# Patient Record
Sex: FEMALE | Race: WHITE | NOT HISPANIC OR LATINO | Employment: OTHER | URBAN - METROPOLITAN AREA
[De-identification: names, ages, dates, MRNs, and addresses within clinical notes are randomized per-mention and may not be internally consistent; named-entity substitution may affect disease eponyms.]

---

## 2023-03-14 LAB
APPEARANCE, URINE: CLEAR
BILIRUBIN, URINE: NEGATIVE
BLOOD, URINE: ABNORMAL
CALCIUM OXALATE CRYSTALS, URINE: ABNORMAL /HPF
COLOR, URINE: YELLOW
GLUCOSE, URINE: NEGATIVE MG/DL
KETONES, URINE: ABNORMAL MG/DL
LEUKOCYTE ESTERASE, URINE: ABNORMAL
MUCUS, URINE: ABNORMAL /LPF
NITRITE, URINE: NEGATIVE
PH, URINE: 7 (ref 5–8)
PROTEIN, URINE: NEGATIVE MG/DL
RBC, URINE: 2 /HPF (ref 0–5)
SPECIFIC GRAVITY, URINE: 1.01 (ref 1–1.03)
SQUAMOUS EPITHELIAL CELLS, URINE: 1 /HPF
UROBILINOGEN, URINE: <2 MG/DL (ref 0–1.9)
WBC, URINE: 6 /HPF (ref 0–5)

## 2023-03-15 LAB — URINE CULTURE: ABNORMAL

## 2023-03-20 LAB
ALANINE AMINOTRANSFERASE (SGPT) (U/L) IN SER/PLAS: <3 U/L (ref 7–45)
ALBUMIN (G/DL) IN SER/PLAS: 3.4 G/DL (ref 3.4–5)
ALKALINE PHOSPHATASE (U/L) IN SER/PLAS: 76 U/L (ref 33–136)
ANION GAP IN SER/PLAS: 10 MMOL/L (ref 10–20)
ASPARTATE AMINOTRANSFERASE (SGOT) (U/L) IN SER/PLAS: 11 U/L (ref 9–39)
BASOPHILS (10*3/UL) IN BLOOD BY AUTOMATED COUNT: 0.02 X10E9/L (ref 0–0.1)
BASOPHILS/100 LEUKOCYTES IN BLOOD BY AUTOMATED COUNT: 0.2 % (ref 0–2)
BILIRUBIN TOTAL (MG/DL) IN SER/PLAS: 0.3 MG/DL (ref 0–1.2)
CALCIUM (MG/DL) IN SER/PLAS: 8.9 MG/DL (ref 8.6–10.3)
CARBON DIOXIDE, TOTAL (MMOL/L) IN SER/PLAS: 29 MMOL/L (ref 21–32)
CHLORIDE (MMOL/L) IN SER/PLAS: 94 MMOL/L (ref 98–107)
CHOLESTEROL (MG/DL) IN SER/PLAS: 206 MG/DL (ref 0–199)
CHOLESTEROL IN HDL (MG/DL) IN SER/PLAS: 71.2 MG/DL
CHOLESTEROL/HDL RATIO: 2.9
CREATININE (MG/DL) IN SER/PLAS: 0.74 MG/DL (ref 0.5–1.05)
EOSINOPHILS (10*3/UL) IN BLOOD BY AUTOMATED COUNT: 0.08 X10E9/L (ref 0–0.4)
EOSINOPHILS/100 LEUKOCYTES IN BLOOD BY AUTOMATED COUNT: 1 % (ref 0–6)
ERYTHROCYTE DISTRIBUTION WIDTH (RATIO) BY AUTOMATED COUNT: 13.5 % (ref 11.5–14.5)
ERYTHROCYTE MEAN CORPUSCULAR HEMOGLOBIN CONCENTRATION (G/DL) BY AUTOMATED: 32.3 G/DL (ref 32–36)
ERYTHROCYTE MEAN CORPUSCULAR VOLUME (FL) BY AUTOMATED COUNT: 93 FL (ref 80–100)
ERYTHROCYTES (10*6/UL) IN BLOOD BY AUTOMATED COUNT: 3.77 X10E12/L (ref 4–5.2)
GFR FEMALE: 79 ML/MIN/1.73M2
GLUCOSE (MG/DL) IN SER/PLAS: 153 MG/DL (ref 74–99)
HEMATOCRIT (%) IN BLOOD BY AUTOMATED COUNT: 35 % (ref 36–46)
HEMOGLOBIN (G/DL) IN BLOOD: 11.3 G/DL (ref 12–16)
IMMATURE GRANULOCYTES/100 LEUKOCYTES IN BLOOD BY AUTOMATED COUNT: 0.4 % (ref 0–0.9)
LDL: 105 MG/DL (ref 0–99)
LEUKOCYTES (10*3/UL) IN BLOOD BY AUTOMATED COUNT: 8.2 X10E9/L (ref 4.4–11.3)
LYMPHOCYTES (10*3/UL) IN BLOOD BY AUTOMATED COUNT: 1.04 X10E9/L (ref 0.8–3)
LYMPHOCYTES/100 LEUKOCYTES IN BLOOD BY AUTOMATED COUNT: 12.7 % (ref 13–44)
MAGNESIUM (MG/DL) IN SER/PLAS: 1.73 MG/DL (ref 1.6–2.4)
MONOCYTES (10*3/UL) IN BLOOD BY AUTOMATED COUNT: 0.64 X10E9/L (ref 0.05–0.8)
MONOCYTES/100 LEUKOCYTES IN BLOOD BY AUTOMATED COUNT: 7.8 % (ref 2–10)
NEUTROPHILS (10*3/UL) IN BLOOD BY AUTOMATED COUNT: 6.41 X10E9/L (ref 1.6–5.5)
NEUTROPHILS/100 LEUKOCYTES IN BLOOD BY AUTOMATED COUNT: 77.9 % (ref 40–80)
PLATELETS (10*3/UL) IN BLOOD AUTOMATED COUNT: 375 X10E9/L (ref 150–450)
POTASSIUM (MMOL/L) IN SER/PLAS: 4.3 MMOL/L (ref 3.5–5.3)
PROTEIN TOTAL: 5.9 G/DL (ref 6.4–8.2)
SODIUM (MMOL/L) IN SER/PLAS: 129 MMOL/L (ref 136–145)
THYROTROPIN (MIU/L) IN SER/PLAS BY DETECTION LIMIT <= 0.05 MIU/L: 1.05 MIU/L (ref 0.44–3.98)
TRIGLYCERIDE (MG/DL) IN SER/PLAS: 151 MG/DL (ref 0–149)
UREA NITROGEN (MG/DL) IN SER/PLAS: 19 MG/DL (ref 6–23)
VLDL: 30 MG/DL (ref 0–40)

## 2023-03-21 LAB
CALCIDIOL (25 OH VITAMIN D3) (NG/ML) IN SER/PLAS: 38 NG/ML
COBALAMIN (VITAMIN B12) (PG/ML) IN SER/PLAS: 433 PG/ML (ref 211–911)
ESTIMATED AVERAGE GLUCOSE FOR HBA1C: 117 MG/DL
HEMOGLOBIN A1C/HEMOGLOBIN TOTAL IN BLOOD: 5.7 %

## 2023-10-27 DIAGNOSIS — G20.A1 PARKINSON'S DISEASE, UNSPECIFIED WHETHER DYSKINESIA PRESENT, UNSPECIFIED WHETHER MANIFESTATIONS FLUCTUATE (MULTI): ICD-10-CM

## 2023-10-27 RX ORDER — CARBIDOPA AND LEVODOPA 25; 100 MG/1; MG/1
TABLET ORAL
COMMUNITY
Start: 2018-03-23 | End: 2023-10-27 | Stop reason: SDUPTHER

## 2023-10-27 RX ORDER — CARBIDOPA AND LEVODOPA 25; 100 MG/1; MG/1
TABLET ORAL
Qty: 450 TABLET | Refills: 1 | Status: SHIPPED | OUTPATIENT
Start: 2023-10-27 | End: 2024-04-26 | Stop reason: SDUPTHER

## 2023-11-20 PROBLEM — M62.838 MUSCLE SPASM: Status: ACTIVE | Noted: 2023-11-20

## 2023-11-20 PROBLEM — M81.0 OSTEOPOROSIS: Status: ACTIVE | Noted: 2023-11-20

## 2023-11-20 PROBLEM — F41.9 ANXIETY: Status: ACTIVE | Noted: 2023-11-20

## 2023-11-20 PROBLEM — E11.9 TYPE 2 DIABETES MELLITUS (MULTI): Status: ACTIVE | Noted: 2023-11-20

## 2023-11-20 PROBLEM — F32.9 MAJOR DEPRESSIVE DISORDER, SINGLE EPISODE, UNSPECIFIED: Status: ACTIVE | Noted: 2023-11-20

## 2023-11-20 PROBLEM — F41.1 GENERALIZED ANXIETY DISORDER: Status: ACTIVE | Noted: 2023-11-20

## 2023-11-20 PROBLEM — R32 INCONTINENCE: Status: ACTIVE | Noted: 2023-11-20

## 2023-11-20 PROBLEM — G47.00 INSOMNIA: Status: ACTIVE | Noted: 2023-11-20

## 2023-11-20 PROBLEM — I77.9 CAROTID ARTERY DISEASE (CMS-HCC): Status: ACTIVE | Noted: 2023-11-20

## 2023-11-20 PROBLEM — R13.10 DYSPHAGIA: Status: ACTIVE | Noted: 2023-11-20

## 2023-11-20 PROBLEM — K21.9 GASTROESOPHAGEAL REFLUX DISEASE: Status: ACTIVE | Noted: 2023-11-20

## 2023-11-20 PROBLEM — M19.90 OSTEOARTHRITIS: Status: ACTIVE | Noted: 2023-11-20

## 2023-11-20 PROBLEM — N39.41 URGE INCONTINENCE OF URINE: Status: ACTIVE | Noted: 2023-11-20

## 2023-11-20 PROBLEM — I25.10 CORONARY ARTERIOSCLEROSIS: Status: ACTIVE | Noted: 2023-11-20

## 2023-11-20 PROBLEM — B02.29 POSTHERPETIC NEURALGIA: Status: ACTIVE | Noted: 2023-11-20

## 2023-11-20 PROBLEM — E78.5 HYPERLIPIDEMIA: Status: ACTIVE | Noted: 2023-11-20

## 2023-11-20 PROBLEM — M41.9 SCOLIOSIS OF LUMBAR SPINE: Status: ACTIVE | Noted: 2023-11-20

## 2023-11-20 RX ORDER — NYSTATIN 100000 U/G
1 OINTMENT TOPICAL 2 TIMES DAILY
COMMUNITY

## 2023-11-20 RX ORDER — TIZANIDINE 2 MG/1
2 TABLET ORAL 3 TIMES DAILY PRN
COMMUNITY
Start: 2022-09-30 | End: 2024-05-13 | Stop reason: SDUPTHER

## 2023-11-20 RX ORDER — POLYETHYLENE GLYCOL 3350 17 G/17G
POWDER, FOR SOLUTION ORAL
COMMUNITY
Start: 2023-01-11

## 2023-11-20 RX ORDER — DOCUSATE SODIUM 100 MG/1
200 CAPSULE, LIQUID FILLED ORAL 2 TIMES DAILY PRN
COMMUNITY
Start: 2021-04-21

## 2023-11-20 RX ORDER — HYDROXYZINE HYDROCHLORIDE 25 MG/1
25 TABLET, FILM COATED ORAL DAILY PRN
COMMUNITY
Start: 2022-11-02

## 2023-11-20 RX ORDER — AMOXICILLIN 250 MG
2 CAPSULE ORAL 2 TIMES DAILY
COMMUNITY

## 2023-11-20 RX ORDER — SERTRALINE HYDROCHLORIDE 100 MG/1
1.5 TABLET, FILM COATED ORAL NIGHTLY
COMMUNITY
Start: 2023-08-17 | End: 2024-06-07 | Stop reason: SDUPTHER

## 2023-11-20 RX ORDER — GABAPENTIN 100 MG/1
100 CAPSULE ORAL 3 TIMES DAILY
COMMUNITY

## 2023-11-22 ENCOUNTER — APPOINTMENT (OUTPATIENT)
Dept: PRIMARY CARE | Facility: CLINIC | Age: 88
End: 2023-11-22
Payer: COMMERCIAL

## 2023-11-24 ENCOUNTER — TELEPHONE (OUTPATIENT)
Dept: PRIMARY CARE | Facility: CLINIC | Age: 88
End: 2023-11-24
Payer: COMMERCIAL

## 2023-11-27 ENCOUNTER — OFFICE VISIT (OUTPATIENT)
Dept: PRIMARY CARE | Facility: CLINIC | Age: 88
End: 2023-11-27
Payer: COMMERCIAL

## 2023-11-27 VITALS
DIASTOLIC BLOOD PRESSURE: 68 MMHG | TEMPERATURE: 97.1 F | HEART RATE: 62 BPM | SYSTOLIC BLOOD PRESSURE: 132 MMHG | OXYGEN SATURATION: 96 %

## 2023-11-27 DIAGNOSIS — F32.9 MAJOR DEPRESSIVE DISORDER WITH SINGLE EPISODE, REMISSION STATUS UNSPECIFIED: ICD-10-CM

## 2023-11-27 DIAGNOSIS — E11.9 TYPE 2 DIABETES MELLITUS WITHOUT COMPLICATION, WITHOUT LONG-TERM CURRENT USE OF INSULIN (MULTI): ICD-10-CM

## 2023-11-27 DIAGNOSIS — Z00.00 HEALTHCARE MAINTENANCE: ICD-10-CM

## 2023-11-27 DIAGNOSIS — G20.A1 PARKINSON'S DISEASE, UNSPECIFIED WHETHER DYSKINESIA PRESENT, UNSPECIFIED WHETHER MANIFESTATIONS FLUCTUATE (MULTI): Primary | ICD-10-CM

## 2023-11-27 DIAGNOSIS — I77.9 CAROTID ARTERY DISEASE, UNSPECIFIED LATERALITY, UNSPECIFIED TYPE (CMS-HCC): ICD-10-CM

## 2023-11-27 DIAGNOSIS — F41.1 GENERALIZED ANXIETY DISORDER: ICD-10-CM

## 2023-11-27 DIAGNOSIS — E55.9 VITAMIN D DEFICIENCY: ICD-10-CM

## 2023-11-27 PROCEDURE — 1159F MED LIST DOCD IN RCRD: CPT | Performed by: NURSE PRACTITIONER

## 2023-11-27 PROCEDURE — 99349 HOME/RES VST EST MOD MDM 40: CPT | Performed by: NURSE PRACTITIONER

## 2023-11-27 PROCEDURE — 1126F AMNT PAIN NOTED NONE PRSNT: CPT | Performed by: NURSE PRACTITIONER

## 2023-11-27 PROCEDURE — 1160F RVW MEDS BY RX/DR IN RCRD: CPT | Performed by: NURSE PRACTITIONER

## 2023-11-27 ASSESSMENT — ENCOUNTER SYMPTOMS
FEVER: 0
DIZZINESS: 0
ABDOMINAL PAIN: 0
DIFFICULTY URINATING: 0
CHILLS: 0
ABDOMINAL DISTENTION: 0
SHORTNESS OF BREATH: 0
JOINT SWELLING: 0
WEAKNESS: 1
NAUSEA: 0
ADENOPATHY: 0
PALPITATIONS: 0
EYE PAIN: 0
BRUISES/BLEEDS EASILY: 0
CONSTIPATION: 0
DIARRHEA: 0
COLOR CHANGE: 0
WOUND: 0
TROUBLE SWALLOWING: 0
HEADACHES: 0
MYALGIAS: 0
SEIZURES: 0
FATIGUE: 1
WHEEZING: 0
COUGH: 0

## 2023-11-27 ASSESSMENT — PAIN SCALES - GENERAL: PAINLEVEL: 0-NO PAIN

## 2023-11-27 NOTE — PROGRESS NOTES
Subjective   Patient ID: Roihni Butler is a 88 y.o. female who presents for F2F [facility] PT/OT/ST.  Spouse requesting [facility]    HPI   Patient seen today up in recliner with , Jhonatan, present for assessment. Patient is a very poor historian secondary to Parkinson's/ dementia; HPI and ROS were supplemented by patient's spouse and patient's chart. Patient is alert and calm today; spouse is now voicing concerns that he feels patient may be depressed. Patient remains uable to walk but she continues to do stregthen exercises with spouse and caregivers. No recent falls reportedOne fall occurred several weeks ago without associated injury. She continues to have private duty care givers 5-6 days a week for 5 hours at a time. The couple's 5 sons continue take turn throughout the week coming over to give addtional support to patient. Overall appetite and hydration status stable per spouse. No recently reported issues with dysphagia, bowel or bladder. Spouse continues to manage all of patient's medications and medical care    Bilateral Knee Pain/ OA/ Muscle Spasms - Muscle relaxant given daily with some success. Patient and spouse states that stretching patient's legs and easing her contrations of BLE. Family is once again interested in receiving [facility] therapy services.    Anxiety/ Depression - Patient continues to follow with [facility]. No recent medication changes or reported issues with insomnia. Follow-up appointment in place for next week    DM2 - Patient's blood glucose levels have not been monitored. Spouse states appetite is fair and no reported episodes of hypo or hyper glycemia.    Home Visit: Medically necessary due to: patient has chronic condition that makes access to a traditional office visit very difficult, Illness or condition that results in activity lmitation or restriction that impacts the ability to leave home such as:, unsteady gait/poor balance, dementia/cognitive impairment.        Current Outpatient Medications:     carbidopa-levodopa (Sinemet)  mg tablet, 2 tabs in am; 2 tabs in afternoon; 1 tab at pm Orally Three times a day, Disp: 450 tablet, Rfl: 1    docusate sodium (Colace) 100 mg capsule, Take 2 capsules (200 mg) by mouth 2 times a day as needed for constipation., Disp: , Rfl:     gabapentin (Neurontin) 100 mg capsule, Take 1 capsule (100 mg) by mouth 3 times a day., Disp: , Rfl:     hydrOXYzine HCL (Atarax) 25 mg tablet, Take 1 tablet (25 mg) by mouth once daily as needed., Disp: , Rfl:     nystatin (Mycostatin) ointment, Apply 1 Application topically 2 times a day., Disp: , Rfl:     polyethylene glycol (Miralax) 17 gram/dose powder, as directed Orally Once a day, Disp: , Rfl:     sennosides-docusate sodium (Perlita-Colace) 8.6-50 mg tablet, Take 2 tablets by mouth 2 times a day., Disp: , Rfl:     sertraline (Zoloft) 100 mg tablet, Take 1 tablet (100 mg) by mouth once daily at bedtime., Disp: , Rfl:     tiZANidine (Zanaflex) 2 mg tablet, Take 1 tablet (2 mg) by mouth 3 times a day as needed for muscle spasms., Disp: , Rfl:      Review of Systems   Constitutional:  Positive for fatigue. Negative for chills and fever.        Positive for generalized malaise and fatigue (chronic)   HENT:  Negative for dental problem and trouble swallowing.    Eyes:  Negative for pain and visual disturbance.   Respiratory:  Negative for cough, shortness of breath and wheezing.    Cardiovascular:  Negative for chest pain, palpitations and leg swelling.   Gastrointestinal:  Negative for abdominal distention, abdominal pain, constipation, diarrhea and nausea.   Endocrine: Negative for cold intolerance and heat intolerance.   Genitourinary:  Negative for difficulty urinating.   Musculoskeletal:  Negative for gait problem, joint swelling and myalgias.   Skin:  Negative for color change, pallor, rash and wound.   Allergic/Immunologic: Negative for environmental allergies and food allergies.    Neurological:  Positive for weakness. Negative for dizziness, seizures and headaches.   Hematological:  Negative for adenopathy. Does not bruise/bleed easily.   Psychiatric/Behavioral:  Negative for behavioral problems.         Positive for dementia   All other systems reviewed and are negative.      Objective   /68   Pulse 62   Temp 36.2 °C (97.1 °F)   SpO2 96%     Physical Exam  Constitutional:       General: She is not in acute distress.     Appearance: Normal appearance. She is not toxic-appearing.   HENT:      Head: Normocephalic and atraumatic.      Nose: Nose normal.      Mouth/Throat:      Mouth: Mucous membranes are moist.      Pharynx: Oropharynx is clear.   Eyes:      Extraocular Movements: Extraocular movements intact.      Conjunctiva/sclera: Conjunctivae normal.      Pupils: Pupils are equal, round, and reactive to light.   Cardiovascular:      Rate and Rhythm: Normal rate and regular rhythm.      Pulses: Normal pulses.      Heart sounds: Murmur heard.   Pulmonary:      Effort: Pulmonary effort is normal.      Breath sounds: Normal breath sounds. No wheezing.   Abdominal:      General: Abdomen is flat. Bowel sounds are normal.      Palpations: Abdomen is soft.   Musculoskeletal:         General: Deformity present. No swelling.      Cervical back: Neck supple.      Comments: BLE contractures   Skin:     General: Skin is warm and dry.      Comments: Perlita-area not observed   Neurological:      Mental Status: She is alert. Mental status is at baseline.      Cranial Nerves: No cranial nerve deficit.      Motor: Weakness present.      Comments: Oriented to self. Non-ambulatory   Psychiatric:      Comments: Positive for dementia and memory loss         Assessment/Plan   Problem List Items Addressed This Visit             ICD-10-CM       Cardiac and Vasculature     Patient currently off of any cardiac medications. Will continue to monitor VS as needed. Spouse to notify provider for any new cardiac  concerns         Carotid artery disease (CMS/[facility]) I77.9       Endocrine/Metabolic     Patient off of any diabetic medications. Will monitor A1C routinely. Spouse to notify provider for any symptoms associated with hypo or hyper glycemia         Type 2 diabetes mellitus (CMS/[facility]) E11.9    Relevant Orders    Hemoglobin A1C    Comprehensive Metabolic Panel    CBC       Mental Health     Patient to continue medication regiment. She is to maintain routine follow up with [facility] for continued evaluation and treatments         Major depressive disorder, single episode, unspecified F32.9    Generalized anxiety disorder F41.1       Neuro     Patient will require [facility] PT/OT/ST services. PT/OT required in order to improve strength, balance and endurance all while decreasing fall risk and pain. ST required to assess for dysphagia given progressive disease         Parkinson's disease - Primary G20.A1     Other Visit Diagnoses         Codes    Healthcare maintenance     Z00.00    Relevant Orders    TSH with reflex to Free T4 if abnormal    Vitamin D deficiency     E55.9    Relevant Orders    Vitamin D 25-Hydroxy,Total (for eval of Vitamin D levels)                 Cheryl Goetz, APRN-CNP

## 2023-11-30 NOTE — ASSESSMENT & PLAN NOTE
Patient to continue medication regiment. She is to maintain routine follow up with [facility] for continued evaluation and treatments

## 2023-11-30 NOTE — ASSESSMENT & PLAN NOTE
Patient off of any diabetic medications. Will monitor A1C routinely. Spouse to notify provider for any symptoms associated with hypo or hyper glycemia

## 2023-11-30 NOTE — ASSESSMENT & PLAN NOTE
Patient currently off of any cardiac medications. Will continue to monitor VS as needed. Spouse to notify provider for any new cardiac concerns

## 2023-12-05 PROCEDURE — G0180 MD CERTIFICATION HHA PATIENT: HCPCS | Performed by: NURSE PRACTITIONER

## 2024-01-16 PROBLEM — G47.00 INSOMNIA, UNSPECIFIED: Status: ACTIVE | Noted: 2021-01-26

## 2024-01-16 PROBLEM — F32.9 MAJOR DEPRESSION: Status: ACTIVE | Noted: 2023-09-16

## 2024-01-16 PROBLEM — K21.9 GASTRO-ESOPHAGEAL REFLUX DISEASE WITHOUT ESOPHAGITIS: Status: ACTIVE | Noted: 2021-01-26

## 2024-01-16 PROBLEM — M81.0 AGE-RELATED OSTEOPOROSIS WITHOUT CURRENT PATHOLOGICAL FRACTURE: Status: ACTIVE | Noted: 2021-01-26

## 2024-01-16 PROBLEM — F41.1 GENERALIZED ANXIETY DISORDER: Status: ACTIVE | Noted: 2021-01-26

## 2024-01-16 RX ORDER — BLOOD SUGAR DIAGNOSTIC
1 STRIP MISCELLANEOUS DAILY
COMMUNITY

## 2024-01-16 RX ORDER — ACETAMINOPHEN 500 MG
TABLET ORAL
COMMUNITY

## 2024-01-18 ENCOUNTER — TELEPHONE (OUTPATIENT)
Dept: PRIMARY CARE | Facility: CLINIC | Age: 89
End: 2024-01-18
Payer: COMMERCIAL

## 2024-01-19 ENCOUNTER — OFFICE VISIT (OUTPATIENT)
Dept: PRIMARY CARE | Facility: CLINIC | Age: 89
End: 2024-01-19
Payer: COMMERCIAL

## 2024-01-19 ENCOUNTER — LAB (OUTPATIENT)
Dept: LAB | Facility: LAB | Age: 89
End: 2024-01-19
Payer: COMMERCIAL

## 2024-01-19 VITALS
OXYGEN SATURATION: 98 % | HEART RATE: 84 BPM | DIASTOLIC BLOOD PRESSURE: 66 MMHG | TEMPERATURE: 97.1 F | SYSTOLIC BLOOD PRESSURE: 118 MMHG

## 2024-01-19 DIAGNOSIS — F32.9 MAJOR DEPRESSIVE DISORDER, REMISSION STATUS UNSPECIFIED, UNSPECIFIED WHETHER RECURRENT: ICD-10-CM

## 2024-01-19 DIAGNOSIS — I25.10 CORONARY ARTERIOSCLEROSIS: Primary | ICD-10-CM

## 2024-01-19 DIAGNOSIS — E55.9 VITAMIN D DEFICIENCY: ICD-10-CM

## 2024-01-19 DIAGNOSIS — E11.9 TYPE 2 DIABETES MELLITUS WITHOUT COMPLICATION, WITHOUT LONG-TERM CURRENT USE OF INSULIN (MULTI): ICD-10-CM

## 2024-01-19 DIAGNOSIS — F41.1 GENERALIZED ANXIETY DISORDER: ICD-10-CM

## 2024-01-19 DIAGNOSIS — Z00.00 HEALTHCARE MAINTENANCE: ICD-10-CM

## 2024-01-19 DIAGNOSIS — M15.9 PRIMARY OSTEOARTHRITIS INVOLVING MULTIPLE JOINTS: ICD-10-CM

## 2024-01-19 LAB
ALBUMIN SERPL BCP-MCNC: 3.9 G/DL (ref 3.4–5)
ALP SERPL-CCNC: 83 U/L (ref 33–136)
ALT SERPL W P-5'-P-CCNC: 5 U/L (ref 7–45)
ANION GAP SERPL CALC-SCNC: 15 MMOL/L (ref 10–20)
AST SERPL W P-5'-P-CCNC: 13 U/L (ref 9–39)
BILIRUB SERPL-MCNC: 0.4 MG/DL (ref 0–1.2)
BUN SERPL-MCNC: 19 MG/DL (ref 6–23)
CALCIUM SERPL-MCNC: 9.6 MG/DL (ref 8.6–10.3)
CHLORIDE SERPL-SCNC: 95 MMOL/L (ref 98–107)
CO2 SERPL-SCNC: 28 MMOL/L (ref 21–32)
CREAT SERPL-MCNC: 0.59 MG/DL (ref 0.5–1.05)
EGFRCR SERPLBLD CKD-EPI 2021: 88 ML/MIN/1.73M*2
ERYTHROCYTE [DISTWIDTH] IN BLOOD BY AUTOMATED COUNT: 14.1 % (ref 11.5–14.5)
GLUCOSE SERPL-MCNC: 156 MG/DL (ref 74–99)
HCT VFR BLD AUTO: 42.6 % (ref 36–46)
HGB BLD-MCNC: 13.5 G/DL (ref 12–16)
MCH RBC QN AUTO: 31.3 PG (ref 26–34)
MCHC RBC AUTO-ENTMCNC: 31.7 G/DL (ref 32–36)
MCV RBC AUTO: 99 FL (ref 80–100)
NRBC BLD-RTO: 0 /100 WBCS (ref 0–0)
PLATELET # BLD AUTO: 310 X10*3/UL (ref 150–450)
POTASSIUM SERPL-SCNC: 4.5 MMOL/L (ref 3.5–5.3)
PROT SERPL-MCNC: 6.9 G/DL (ref 6.4–8.2)
RBC # BLD AUTO: 4.32 X10*6/UL (ref 4–5.2)
SODIUM SERPL-SCNC: 133 MMOL/L (ref 136–145)
TSH SERPL-ACNC: 1.43 MIU/L (ref 0.44–3.98)
WBC # BLD AUTO: 8.6 X10*3/UL (ref 4.4–11.3)

## 2024-01-19 PROCEDURE — 1160F RVW MEDS BY RX/DR IN RCRD: CPT | Performed by: NURSE PRACTITIONER

## 2024-01-19 PROCEDURE — 99349 HOME/RES VST EST MOD MDM 40: CPT | Performed by: NURSE PRACTITIONER

## 2024-01-19 PROCEDURE — 80053 COMPREHEN METABOLIC PANEL: CPT

## 2024-01-19 PROCEDURE — 1159F MED LIST DOCD IN RCRD: CPT | Performed by: NURSE PRACTITIONER

## 2024-01-19 PROCEDURE — 36415 COLL VENOUS BLD VENIPUNCTURE: CPT

## 2024-01-19 PROCEDURE — 1126F AMNT PAIN NOTED NONE PRSNT: CPT | Performed by: NURSE PRACTITIONER

## 2024-01-19 PROCEDURE — 83036 HEMOGLOBIN GLYCOSYLATED A1C: CPT

## 2024-01-19 PROCEDURE — 36415 COLL VENOUS BLD VENIPUNCTURE: CPT | Performed by: NURSE PRACTITIONER

## 2024-01-19 PROCEDURE — 85027 COMPLETE CBC AUTOMATED: CPT

## 2024-01-19 PROCEDURE — 84443 ASSAY THYROID STIM HORMONE: CPT

## 2024-01-19 PROCEDURE — 82306 VITAMIN D 25 HYDROXY: CPT

## 2024-01-19 ASSESSMENT — ENCOUNTER SYMPTOMS
BRUISES/BLEEDS EASILY: 0
ADENOPATHY: 0
JOINT SWELLING: 0
DIZZINESS: 0
SEIZURES: 0
CONSTIPATION: 0
MYALGIAS: 0
EYE PAIN: 0
WEAKNESS: 1
COLOR CHANGE: 0
ABDOMINAL DISTENTION: 0
PALPITATIONS: 0
HEADACHES: 0
FATIGUE: 0
FEVER: 0
SHORTNESS OF BREATH: 0
WHEEZING: 0
DIARRHEA: 0
CHILLS: 0
DIFFICULTY URINATING: 0
TROUBLE SWALLOWING: 0
COUGH: 0
WOUND: 0
NAUSEA: 0
ABDOMINAL PAIN: 0

## 2024-01-19 ASSESSMENT — PAIN SCALES - GENERAL: PAINLEVEL: 0-NO PAIN

## 2024-01-19 NOTE — PROGRESS NOTES
Subjective   Patient ID: Rohini Butler is a 88 y.o. female who presents for Routine Follow-up.    HPI   Patient seen today up in recliner with , Jhonatan, present for assessment. Patient is a very poor historian secondary to Parkinson's/ dementia; HPI and ROS were supplemented by patient's spouse and patient's chart. Patient is alert and calm today; she appears in good spirits. She continues to follow with Michelle Chavez CNP with [facility] for mental health services. Patient remains uable to walk but she continues to do stregthen exercises with spouse and caregivers. No recent falls reported. [facility] PT/OT services in place through [facility]. She also continues to have private duty care givers 5-6 days a week for 5 hours at a time. The couple's 5 sons continue take turn throughout the week coming over to give addtional support to patient. Overall appetite and hydration status stable per spouse. No recently reported issues with dysphagia, bowel or bladder. Spouse continues to manage all of patient's medications and medical care    Bilateral Knee Pain/ OA/ Muscle Spasms - Muscle relaxant given daily with some success. Patient and spouse states that stretching patient's legs and easing her contrations of BLE.     Anxiety/ Depression - Patient continues to follow with [facility]. Zoloft increased last month by Michelle to 150ng. No real reposne noted by spouse.     DM2 - Patient's blood glucose levels have not been monitored. Spouse states appetite is fair and no reported episodes of hypo or hyper glycemia.    Home Visit: Medically necessary due to: patient has chronic condition that makes access to a traditional office visit very difficult, Illness or condition that results in activity lmitation or restriction that impacts the ability to leave home such as:, unsteady gait/poor balance, dementia/cognitive impairment.       Current Outpatient Medications:     acetaminophen (Tylenol) 500 mg tablet, 1-2  tablets as needed Orally every 8 hrs as needed, Disp: , Rfl:     carbidopa-levodopa (Sinemet)  mg tablet, 2 tabs in am; 2 tabs in afternoon; 1 tab at pm Orally Three times a day, Disp: 450 tablet, Rfl: 1    docusate sodium (Colace) 100 mg capsule, Take 2 capsules (200 mg) by mouth 2 times a day as needed for constipation., Disp: , Rfl:     gabapentin (Neurontin) 100 mg capsule, Take 1 capsule (100 mg) by mouth 3 times a day., Disp: , Rfl:     hydrOXYzine HCL (Atarax) 25 mg tablet, Take 1 tablet (25 mg) by mouth once daily as needed., Disp: , Rfl:     nystatin (Mycostatin) ointment, Apply 1 Application topically 2 times a day., Disp: , Rfl:     polyethylene glycol (Miralax) 17 gram/dose powder, as directed Orally Once a day, Disp: , Rfl:     sennosides-docusate sodium (Perlita-Colace) 8.6-50 mg tablet, Take 2 tablets by mouth 2 times a day., Disp: , Rfl:     sertraline (Zoloft) 100 mg tablet, Take 1.5 mg by mouth once daily at bedtime., Disp: , Rfl:     tiZANidine (Zanaflex) 2 mg tablet, Take 1 tablet (2 mg) by mouth 3 times a day as needed for muscle spasms., Disp: , Rfl:     OneTouch Ultra Test strip, 1 strip once daily., Disp: , Rfl:      Review of Systems   Constitutional:  Negative for chills, fatigue and fever.        Positive for generalized malaise and fatigue (chronic)   HENT:  Negative for dental problem and trouble swallowing.    Eyes:  Negative for pain and visual disturbance.   Respiratory:  Negative for cough, shortness of breath and wheezing.    Cardiovascular:  Negative for chest pain, palpitations and leg swelling.   Gastrointestinal:  Negative for abdominal distention, abdominal pain, constipation, diarrhea and nausea.   Endocrine: Negative for cold intolerance and heat intolerance.   Genitourinary:  Negative for difficulty urinating.   Musculoskeletal:  Negative for gait problem, joint swelling and myalgias.   Skin:  Negative for color change, pallor, rash and wound.   Allergic/Immunologic:  Negative for environmental allergies and food allergies.   Neurological:  Positive for weakness. Negative for dizziness, seizures and headaches.   Hematological:  Negative for adenopathy. Does not bruise/bleed easily.   Psychiatric/Behavioral:  Negative for behavioral problems.         Positive for dementia   All other systems reviewed and are negative.      Objective   /66   Pulse 84   Temp 36.2 °C (97.1 °F)   SpO2 98%     Physical Exam  Constitutional:       General: She is not in acute distress.     Appearance: Normal appearance. She is not toxic-appearing.   HENT:      Head: Normocephalic and atraumatic.      Nose: Nose normal.      Mouth/Throat:      Mouth: Mucous membranes are moist.      Pharynx: Oropharynx is clear.   Eyes:      Extraocular Movements: Extraocular movements intact.      Conjunctiva/sclera: Conjunctivae normal.      Pupils: Pupils are equal, round, and reactive to light.   Cardiovascular:      Rate and Rhythm: Normal rate and regular rhythm.      Pulses: Normal pulses.      Heart sounds: Murmur heard.   Pulmonary:      Effort: Pulmonary effort is normal.      Breath sounds: Normal breath sounds. No wheezing.   Abdominal:      General: Abdomen is flat. Bowel sounds are normal.      Palpations: Abdomen is soft.   Musculoskeletal:         General: Deformity present. No swelling.      Cervical back: Neck supple.      Comments: BLE contractures   Skin:     General: Skin is warm and dry.      Comments: Perlita-area not observed   Neurological:      Mental Status: She is alert. Mental status is at baseline.      Cranial Nerves: No cranial nerve deficit.      Motor: Weakness present.      Comments: Oriented to self. Non-ambulatory   Psychiatric:      Comments: Positive for dementia and memory loss         Assessment/Plan   Problem List Items Addressed This Visit             ICD-10-CM    Type 2 diabetes mellitus (CMS/[facility]) E11.9    Major depression F32.9    Osteoarthritis M19.90    Generalized  anxiety disorder F41.1    Coronary arteriosclerosis - Primary I25.10     -Patient currently off of any cardiac medications. Will continue to monitor VS as needed. Spouse to notify provider for any new cardiac concerns  -Patient to continue medication regiment. She is to maintain routine follow up with [facility] for continued evaluation and treatments  -Continue with comfort measures and supportive care  -Patient off of any diabetic medications. Will monitor A1C routinely. Spouse to notify provider for any symptoms associated with hypo or hyper glycemia  -Routine blood work drawn today without issue for monitoring purposes         AIMEE Sanders-CNP

## 2024-01-20 LAB
25(OH)D3 SERPL-MCNC: 34 NG/ML (ref 30–100)
EST. AVERAGE GLUCOSE BLD GHB EST-MCNC: 120 MG/DL
HBA1C MFR BLD: 5.8 %

## 2024-01-21 ENCOUNTER — TELEPHONE (OUTPATIENT)
Dept: PRIMARY CARE | Facility: CLINIC | Age: 89
End: 2024-01-21
Payer: COMMERCIAL

## 2024-01-21 NOTE — ASSESSMENT & PLAN NOTE
Patient to continue medication regiment. She is to maintain routine follow up with [facility] for continued evaluation and treatments

## 2024-01-22 NOTE — TELEPHONE ENCOUNTER
Most recent blood work reviewed from 01/19/2024 and found to be stable. Vitamin D, TSH and A1C are WNL. No anemia present. Kidney and liver function are stable. Will continue to monitor routinely. Can you please update patient's spouse of normal lab results?

## 2024-02-03 PROCEDURE — G0179 MD RECERTIFICATION HHA PT: HCPCS | Performed by: NURSE PRACTITIONER

## 2024-03-22 ENCOUNTER — TELEPHONE (OUTPATIENT)
Dept: PRIMARY CARE | Facility: CLINIC | Age: 89
End: 2024-03-22
Payer: COMMERCIAL

## 2024-03-22 NOTE — TELEPHONE ENCOUNTER
Patient  returning call to [place] [facility] office.  COVID screening negative, verbal consent for provider to come to home to see patient obtained via phone with patient . Appointment confirmed.

## 2024-03-25 ENCOUNTER — OFFICE VISIT (OUTPATIENT)
Dept: PRIMARY CARE | Facility: CLINIC | Age: 89
End: 2024-03-25
Payer: COMMERCIAL

## 2024-03-25 VITALS
DIASTOLIC BLOOD PRESSURE: 60 MMHG | OXYGEN SATURATION: 96 % | SYSTOLIC BLOOD PRESSURE: 100 MMHG | TEMPERATURE: 97.3 F | HEART RATE: 66 BPM

## 2024-03-25 DIAGNOSIS — F32.9 MAJOR DEPRESSIVE DISORDER, REMISSION STATUS UNSPECIFIED, UNSPECIFIED WHETHER RECURRENT: ICD-10-CM

## 2024-03-25 DIAGNOSIS — I77.9 CAROTID ARTERY DISEASE, UNSPECIFIED LATERALITY, UNSPECIFIED TYPE (CMS-HCC): ICD-10-CM

## 2024-03-25 DIAGNOSIS — R53.83 FATIGUE, UNSPECIFIED TYPE: Primary | ICD-10-CM

## 2024-03-25 DIAGNOSIS — G20.A1 PARKINSON'S DISEASE, UNSPECIFIED WHETHER DYSKINESIA PRESENT, UNSPECIFIED WHETHER MANIFESTATIONS FLUCTUATE (MULTI): ICD-10-CM

## 2024-03-25 DIAGNOSIS — F41.1 GENERALIZED ANXIETY DISORDER: ICD-10-CM

## 2024-03-25 PROCEDURE — 1159F MED LIST DOCD IN RCRD: CPT | Performed by: NURSE PRACTITIONER

## 2024-03-25 PROCEDURE — 1126F AMNT PAIN NOTED NONE PRSNT: CPT | Performed by: NURSE PRACTITIONER

## 2024-03-25 PROCEDURE — 1160F RVW MEDS BY RX/DR IN RCRD: CPT | Performed by: NURSE PRACTITIONER

## 2024-03-25 PROCEDURE — 99349 HOME/RES VST EST MOD MDM 40: CPT | Performed by: NURSE PRACTITIONER

## 2024-03-25 ASSESSMENT — ENCOUNTER SYMPTOMS
DIFFICULTY URINATING: 0
PALPITATIONS: 0
SHORTNESS OF BREATH: 0
CONSTIPATION: 0
COLOR CHANGE: 0
ADENOPATHY: 0
FATIGUE: 0
EYE PAIN: 0
DIARRHEA: 0
ABDOMINAL PAIN: 0
FEVER: 0
BRUISES/BLEEDS EASILY: 0
TROUBLE SWALLOWING: 0
WEAKNESS: 1
SEIZURES: 0
HEADACHES: 0
NAUSEA: 0
COUGH: 0
CHILLS: 0
ABDOMINAL DISTENTION: 0
JOINT SWELLING: 0
WHEEZING: 0
WOUND: 0
DIZZINESS: 0
MYALGIAS: 0

## 2024-03-25 ASSESSMENT — PAIN SCALES - GENERAL: PAINLEVEL: 0-NO PAIN

## 2024-03-25 NOTE — PROGRESS NOTES
Subjective   Patient ID: Rohini Butler is a 88 y.o. female who presents for Routine Follow-up.    HPI   Patient seen today up in recliner with , Jhonatan, present for assessment. Patient is a very poor historian secondary to Parkinson's/ dementia; HPI and ROS were supplemented by patient's spouse and patient's chart. Patient appears fatigued but calm and is easily woken. Spouse states that she has has increased fatigue over the past several weeks and he is concerned it may be related to her medications. Medications reviewed and no changes have been made since December, 2023. Spouse states that he is concerned that patient is not sleeping at night and will take up to 6 hour naps during the day. She continues to follow with Michelle Chavez CNP with [facility] for mental health services. Patient remains uable to walk but she continues to do stregthen exercises with spouse and caregivers. No recent falls reported. [facility] PT/OT services are no longer in place through [facility]. She continues to have private duty care givers 5-6 days a week for 5 hours at a time. The couple's 5 sons continue take turn throughout the week coming over to give addtional support to patient. Overall appetite and hydration status stable per spouse. No recently reported issues with dysphagia, bowel or bladder. Spouse continues to manage all of patient's medications and medical care    Bilateral Knee Pain/ OA/ Muscle Spasms - Muscle relaxant given daily with some success. Patient and spouse states that stretching patient's legs and easing her contrations of BLE.     Anxiety/ Depression - Patient continues to follow with [facility]. Spouse continues to voice concerns about persistent depression    DM2 - Patient's blood glucose levels have not been monitored. Spouse states appetite is fair and no reported episodes of hypo or hyper glycemia.    Home Visit: Medically necessary due to: patient has chronic condition that makes  access to a traditional office visit very difficult, Illness or condition that results in activity lmitation or restriction that impacts the ability to leave home such as:, unsteady gait/poor balance, dementia/cognitive impairment.       Current Outpatient Medications:     acetaminophen (Tylenol) 500 mg tablet, 1-2 tablets as needed Orally every 8 hrs as needed, Disp: , Rfl:     carbidopa-levodopa (Sinemet)  mg tablet, 2 tabs in am; 2 tabs in afternoon; 1 tab at pm Orally Three times a day, Disp: 450 tablet, Rfl: 1    docusate sodium (Colace) 100 mg capsule, Take 2 capsules (200 mg) by mouth 2 times a day as needed for constipation., Disp: , Rfl:     gabapentin (Neurontin) 100 mg capsule, Take 1 capsule (100 mg) by mouth 3 times a day., Disp: , Rfl:     hydrOXYzine HCL (Atarax) 25 mg tablet, Take 1 tablet (25 mg) by mouth once daily as needed., Disp: , Rfl:     nystatin (Mycostatin) ointment, Apply 1 Application topically 2 times a day., Disp: , Rfl:     polyethylene glycol (Miralax) 17 gram/dose powder, as directed Orally Once a day, Disp: , Rfl:     sennosides-docusate sodium (Perlita-Colace) 8.6-50 mg tablet, Take 2 tablets by mouth 2 times a day., Disp: , Rfl:     sertraline (Zoloft) 100 mg tablet, Take 1.5 mg by mouth once daily at bedtime., Disp: , Rfl:     tiZANidine (Zanaflex) 2 mg tablet, Take 1 tablet (2 mg) by mouth 3 times a day as needed for muscle spasms., Disp: , Rfl:     OneTouch Ultra Test strip, 1 strip once daily., Disp: , Rfl:      Review of Systems   Constitutional:  Negative for chills, fatigue and fever.        Positive for generalized malaise and fatigue (chronic)   HENT:  Negative for dental problem and trouble swallowing.    Eyes:  Negative for pain and visual disturbance.   Respiratory:  Negative for cough, shortness of breath and wheezing.    Cardiovascular:  Negative for chest pain, palpitations and leg swelling.   Gastrointestinal:  Negative for abdominal distention, abdominal pain,  constipation, diarrhea and nausea.   Endocrine: Negative for cold intolerance and heat intolerance.   Genitourinary:  Negative for difficulty urinating.   Musculoskeletal:  Negative for gait problem, joint swelling and myalgias.   Skin:  Negative for color change, pallor, rash and wound.   Allergic/Immunologic: Negative for environmental allergies and food allergies.   Neurological:  Positive for weakness. Negative for dizziness, seizures and headaches.   Hematological:  Negative for adenopathy. Does not bruise/bleed easily.   Psychiatric/Behavioral:  Negative for behavioral problems.         Positive for dementia   All other systems reviewed and are negative.      Objective   /60   Pulse 66   Temp 36.3 °C (97.3 °F)   SpO2 96%     Physical Exam  Constitutional:       General: She is not in acute distress.     Appearance: Normal appearance. She is not toxic-appearing.   HENT:      Head: Normocephalic and atraumatic.      Nose: Nose normal.      Mouth/Throat:      Mouth: Mucous membranes are moist.      Pharynx: Oropharynx is clear.   Eyes:      Extraocular Movements: Extraocular movements intact.      Conjunctiva/sclera: Conjunctivae normal.      Pupils: Pupils are equal, round, and reactive to light.   Cardiovascular:      Rate and Rhythm: Normal rate and regular rhythm.      Pulses: Normal pulses.      Heart sounds: Murmur heard.   Pulmonary:      Effort: Pulmonary effort is normal.      Breath sounds: Normal breath sounds. No wheezing.   Abdominal:      General: Abdomen is flat. Bowel sounds are normal.      Palpations: Abdomen is soft.   Musculoskeletal:         General: Deformity present. No swelling.      Cervical back: Neck supple.      Comments: BLE contractures   Skin:     General: Skin is warm and dry.      Comments: Perlita-area not observed   Neurological:      Mental Status: She is alert. Mental status is at baseline.      Cranial Nerves: No cranial nerve deficit.      Motor: Weakness present.       Comments: Oriented to self. Non-ambulatory   Psychiatric:      Comments: Positive for dementia and memory loss       Assessment/Plan   Problem List Items Addressed This Visit    None  Visit Diagnoses         Codes    Fatigue, unspecified type    -  Primary R53.83    Relevant Orders    Urinalysis with Reflex Microscopic    Urine Culture          -Increased fatigue could be multifactorial? Will check UA?UC to assess for acute pathology. Discussed with spouse limiting day time naps and following up with mental health provider for additional recommendations  -Patient currently off of any cardiac medications. Will continue to monitor VS as needed. Spouse to notify provider for any new cardiac concerns  -Patient to continue medication regiment. She is to maintain routine follow up with [facility] for continued evaluation and treatments  -Continue with comfort measures and supportive care  AIMEE Sanders-CNP

## 2024-03-26 ENCOUNTER — TELEPHONE (OUTPATIENT)
Dept: PRIMARY CARE | Facility: CLINIC | Age: 89
End: 2024-03-26
Payer: COMMERCIAL

## 2024-03-26 NOTE — TELEPHONE ENCOUNTER
Call placed to patient number as listed, [phone], appointment with Sara Elizabeth NP-C, 4/26/24 9

## 2024-03-29 ENCOUNTER — LAB (OUTPATIENT)
Dept: LAB | Facility: LAB | Age: 89
End: 2024-03-29
Payer: COMMERCIAL

## 2024-03-29 DIAGNOSIS — R53.83 FATIGUE, UNSPECIFIED TYPE: ICD-10-CM

## 2024-03-29 LAB
APPEARANCE UR: ABNORMAL
BACTERIA #/AREA URNS AUTO: ABNORMAL /HPF
BILIRUB UR STRIP.AUTO-MCNC: NEGATIVE MG/DL
COLOR UR: YELLOW
GLUCOSE UR STRIP.AUTO-MCNC: NEGATIVE MG/DL
KETONES UR STRIP.AUTO-MCNC: ABNORMAL MG/DL
LEUKOCYTE ESTERASE UR QL STRIP.AUTO: ABNORMAL
MUCOUS THREADS #/AREA URNS AUTO: ABNORMAL /LPF
NITRITE UR QL STRIP.AUTO: NEGATIVE
PH UR STRIP.AUTO: 6 [PH]
PROT UR STRIP.AUTO-MCNC: NEGATIVE MG/DL
RBC # UR STRIP.AUTO: ABNORMAL /UL
RBC #/AREA URNS AUTO: ABNORMAL /HPF
SP GR UR STRIP.AUTO: 1.02
SQUAMOUS #/AREA URNS AUTO: ABNORMAL /HPF
UROBILINOGEN UR STRIP.AUTO-MCNC: <2 MG/DL
WBC #/AREA URNS AUTO: ABNORMAL /HPF

## 2024-03-29 PROCEDURE — 87086 URINE CULTURE/COLONY COUNT: CPT

## 2024-03-29 PROCEDURE — 81001 URINALYSIS AUTO W/SCOPE: CPT

## 2024-03-31 LAB — BACTERIA UR CULT: NORMAL

## 2024-04-24 PROBLEM — E11.9 TYPE 2 DIABETES MELLITUS WITHOUT COMPLICATION (MULTI): Status: ACTIVE | Noted: 2021-01-26

## 2024-04-24 PROBLEM — Z20.822 CONTACT WITH AND (SUSPECTED) EXPOSURE TO COVID-19: Status: RESOLVED | Noted: 2022-03-29 | Resolved: 2024-04-24

## 2024-04-25 ENCOUNTER — TELEPHONE (OUTPATIENT)
Dept: PRIMARY CARE | Facility: CLINIC | Age: 89
End: 2024-04-25
Payer: COMMERCIAL

## 2024-04-26 ENCOUNTER — OFFICE VISIT (OUTPATIENT)
Dept: PRIMARY CARE | Facility: CLINIC | Age: 89
End: 2024-04-26
Payer: COMMERCIAL

## 2024-04-26 VITALS
TEMPERATURE: 97.8 F | DIASTOLIC BLOOD PRESSURE: 78 MMHG | OXYGEN SATURATION: 95 % | HEART RATE: 77 BPM | RESPIRATION RATE: 17 BRPM | SYSTOLIC BLOOD PRESSURE: 128 MMHG

## 2024-04-26 DIAGNOSIS — M62.838 MUSCLE SPASM: ICD-10-CM

## 2024-04-26 DIAGNOSIS — F41.1 GENERALIZED ANXIETY DISORDER: ICD-10-CM

## 2024-04-26 DIAGNOSIS — G20.A1 PARKINSON'S DISEASE, UNSPECIFIED WHETHER DYSKINESIA PRESENT, UNSPECIFIED WHETHER MANIFESTATIONS FLUCTUATE (MULTI): ICD-10-CM

## 2024-04-26 DIAGNOSIS — E78.5 HYPERLIPIDEMIA, UNSPECIFIED HYPERLIPIDEMIA TYPE: ICD-10-CM

## 2024-04-26 DIAGNOSIS — M81.0 AGE-RELATED OSTEOPOROSIS WITHOUT CURRENT PATHOLOGICAL FRACTURE: ICD-10-CM

## 2024-04-26 DIAGNOSIS — E11.9 TYPE 2 DIABETES MELLITUS WITHOUT COMPLICATION, WITHOUT LONG-TERM CURRENT USE OF INSULIN (MULTI): ICD-10-CM

## 2024-04-26 DIAGNOSIS — R13.10 DYSPHAGIA, UNSPECIFIED TYPE: ICD-10-CM

## 2024-04-26 DIAGNOSIS — I25.10 CORONARY ARTERIOSCLEROSIS: ICD-10-CM

## 2024-04-26 DIAGNOSIS — I65.23 BILATERAL CAROTID ARTERY STENOSIS: Primary | ICD-10-CM

## 2024-04-26 PROCEDURE — 1160F RVW MEDS BY RX/DR IN RCRD: CPT

## 2024-04-26 PROCEDURE — 1126F AMNT PAIN NOTED NONE PRSNT: CPT

## 2024-04-26 PROCEDURE — 1159F MED LIST DOCD IN RCRD: CPT

## 2024-04-26 PROCEDURE — 99349 HOME/RES VST EST MOD MDM 40: CPT

## 2024-04-26 RX ORDER — CARBIDOPA AND LEVODOPA 25; 100 MG/1; MG/1
TABLET ORAL
Qty: 450 TABLET | Refills: 1 | Status: SHIPPED | OUTPATIENT
Start: 2024-04-26

## 2024-04-26 ASSESSMENT — ENCOUNTER SYMPTOMS
CHOKING: 0
HEMATURIA: 0
SPEECH DIFFICULTY: 1
FATIGUE: 0
CONSTIPATION: 0
BRUISES/BLEEDS EASILY: 0
NUMBNESS: 0
ACTIVITY CHANGE: 0
FEVER: 0
WHEEZING: 0
BLOOD IN STOOL: 0
EYE DISCHARGE: 0
COUGH: 1
LIGHT-HEADEDNESS: 0
DIARRHEA: 0
DYSURIA: 0
SORE THROAT: 0
ABDOMINAL PAIN: 0
DIFFICULTY URINATING: 0
WOUND: 1
PALPITATIONS: 0
HEADACHES: 0
NAUSEA: 0
SEIZURES: 0
APPETITE CHANGE: 0
SLEEP DISTURBANCE: 1
DIZZINESS: 0
ROS SKIN COMMENTS: NATAL CLEFT
DIAPHORESIS: 0
CHILLS: 0
UNEXPECTED WEIGHT CHANGE: 0
EYE PAIN: 0
VOMITING: 0
TREMORS: 0
ABDOMINAL DISTENTION: 0
RHINORRHEA: 0
SHORTNESS OF BREATH: 0
FLANK PAIN: 0

## 2024-04-26 ASSESSMENT — PAIN SCALES - GENERAL: PAINLEVEL: 0-NO PAIN

## 2024-04-26 NOTE — PROGRESS NOTES
Subjective   Patient ID: Rohini Butler is a 88 y.o. female who presents for Follow-up (Routine follow up).    HPI     Patient seen today up in recliner with , Jhonatan, who is present for assessment. Patient is a very poor historian secondary to Parkinson's/ dementia; HPI and ROS were supplemented by patient's spouse and patient's chart. Spouse states that she has has been sleeping a lot during the day the last two months, not sleeping well at  and he is concerned it may be related to her medications. He states that he would like to scale back the sertraline to 100 mg instead of 150 mg. She continues to follow with Michelle Chavez CNP with [facility] for mental health services. Patient remains uable to walk but she continues to do stregthen exercises with spouse and caregivers. No recent falls reported. [facility] PT/OT services are no longer in place through [facility]. She continues to have private duty care givers 5-6 days a week for 5 hours at a time. The couple's 5 sons continue take turn throughout the week coming over to give addtional support to patient. Overall appetite and hydration status stable per spouse. He does report that he has noticed that it seems to take her more time to swallow pills in the last couple of weeks. He denies any coughing or choking with meals. No recently reported issues with bowel or bladder. Spouse continues to manage all of patient's medications and medical care     Bilateral Knee Pain/ OA/ Muscle Spasms - Muscle relaxant given daily with some success. Patient and spouse states that stretching patient's legs and performing exercises that PT/OT did with patient help to keep her BLE contractions from getting worse.      Anxiety/ Depression - Patient continues to follow with Mental Health Services. Spouse continues to voice concerns about persistent depression and sleep disturbance.     Home Visit: Medically necessary due to: patient has chronic condition that makes access to  a traditional office visit very difficult, Illness or condition that results in activity lmitation or restriction that impacts the ability to leave home such as:, unsteady gait/poor balance, dementia/cognitive impairment.       Current Outpatient Medications:     acetaminophen (Tylenol) 500 mg tablet, 1-2 tablets as needed Orally every 8 hrs as needed, Disp: , Rfl:     carbidopa-levodopa (Sinemet)  mg tablet, 2 tabs in am; 2 tabs in afternoon; 1 tab at pm Orally Three times a day, Disp: 450 tablet, Rfl: 1    docusate sodium (Colace) 100 mg capsule, Take 2 capsules (200 mg) by mouth 2 times a day as needed for constipation., Disp: , Rfl:     gabapentin (Neurontin) 100 mg capsule, Take 1 capsule (100 mg) by mouth 3 times a day., Disp: , Rfl:     hydrOXYzine HCL (Atarax) 25 mg tablet, Take 1 tablet (25 mg) by mouth once daily as needed., Disp: , Rfl:     nystatin (Mycostatin) ointment, Apply 1 Application topically 2 times a day., Disp: , Rfl:     polyethylene glycol (Miralax) 17 gram/dose powder, as directed Orally Once a day, Disp: , Rfl:     sennosides-docusate sodium (Perlita-Colace) 8.6-50 mg tablet, Take 2 tablets by mouth 2 times a day., Disp: , Rfl:     sertraline (Zoloft) 100 mg tablet, Take 1.5 mg by mouth once daily at bedtime., Disp: , Rfl:     tiZANidine (Zanaflex) 2 mg tablet, Take 1 tablet (2 mg) by mouth 3 times a day as needed for muscle spasms., Disp: , Rfl:     OneTouch Ultra Test strip, 1 strip once daily., Disp: , Rfl:      Review of Systems   Constitutional:  Negative for activity change, appetite change, chills, diaphoresis, fatigue, fever and unexpected weight change.   HENT:  Negative for congestion, rhinorrhea and sore throat.    Eyes:  Negative for pain, discharge and visual disturbance.   Respiratory:  Positive for cough. Negative for choking, shortness of breath and wheezing.    Cardiovascular:  Negative for chest pain, palpitations and leg swelling.   Gastrointestinal:  Negative for  abdominal distention, abdominal pain, blood in stool, constipation, diarrhea, nausea and vomiting.   Genitourinary:  Negative for difficulty urinating, dysuria, flank pain and hematuria.   Musculoskeletal:  Positive for gait problem.        BLE contractures   Skin:  Positive for wound. Negative for pallor and rash.        Tiffani cleft    Neurological:  Positive for speech difficulty. Negative for dizziness, tremors, seizures, syncope, light-headedness, numbness and headaches.   Hematological:  Does not bruise/bleed easily.   Psychiatric/Behavioral:  Positive for sleep disturbance.         Pascale, private aid, reports that patient becomes agitated more often lately. Does not want to do her exercises, all she wants to do is sleep.        Objective   /78 (BP Location: Left arm, Patient Position: Sitting, BP Cuff Size: Adult)   Pulse 77   Temp 36.6 °C (97.8 °F) (Temporal)   Resp 17   SpO2 95% Comment: room air    Physical Exam  Constitutional:       General: She is not in acute distress.     Comments: Chronically ill, frail-looking   HENT:      Head: Normocephalic.      Right Ear: External ear normal.      Left Ear: External ear normal.      Nose: No congestion or rhinorrhea.      Mouth/Throat:      Mouth: Mucous membranes are moist.   Eyes:      General: No scleral icterus.     Pupils: Pupils are equal, round, and reactive to light.   Cardiovascular:      Rate and Rhythm: Normal rate and regular rhythm.      Heart sounds: Murmur heard.   Pulmonary:      Effort: Pulmonary effort is normal. No respiratory distress.      Breath sounds: No wheezing or rales.   Abdominal:      Palpations: Abdomen is soft.      Tenderness: There is no abdominal tenderness. There is no guarding.   Musculoskeletal:      Right lower leg: No edema.      Left lower leg: No edema.      Comments: BLE with contractures. All extremities have a general stiffness.    Skin:     General: Skin is warm and dry.      Coloration: Skin is pale.       "Findings: No rash.      Comments: Did not visualize catherine area. Small area of open skin in the warren cleft, per caregiver.     Neurological:      Mental Status: She is alert. Mental status is at baseline.      Comments: Able to answer with yes or no, follows simple commands. At baseline per     Psychiatric:      Comments: No agitation, no behavioral problems noted during this visit         Assessment/Plan   Diagnoses and all orders for this visit:  Bilateral carotid artery stenosis  Hyperlipidemia, unspecified hyperlipidemia type  Coronary arteriosclerosis  Lab Results   Component Value Date    CHOL 206 (H) 2023     Lab Results   Component Value Date    HDL 71.2 2023     No results found for: \"LDLCALC\"  Lab Results   Component Value Date    TRIG 151 (H) 2023   Stable BP, no chest pain or dizziness reported.   -no changes, continue to monitor  Type 2 diabetes mellitus without complication, without long-term current use of insulin (Multi)  Lab Results   Component Value Date    HGBA1C 5.8 (H) 2024   No hypo/hyperglycemic episodes.   -no changes, continue to monitor  Parkinson's disease, unspecified whether dyskinesia present, unspecified whether manifestations fluctuate (Multi)  Muscle spasm  Dysphagia, unspecified type  No reported falls. Some difficulty with swallowing pills. Spouse continues to report concerns with excessive daytime sleepiness. Previously followed with Paulo Marroquin MD (Neurology), but has not seen since 2022.  -carbidopa-levodopa (Sinemet)  mg tablet; 2 tabs in am; 2 tabs in afternoon; 1 tab at pm Orally Three times a day  -continue prn tinazidine for spasms   -conduct sleep inventory, r/o DANAY  -schedule follow up with Dr. Marroquin for recommendations  -review medications, consider adjusting/discontinuing sertraline (SSRIs have been known to cause sleep disturbance in PD) and daytime dosing of gabapentin.   -education provided Aspiration " Precautions  Age-related osteoporosis without current pathological fracture  No reported falls.   -no changes, continue to monitor  Generalized anxiety disorder  Follows with [facility] for medication management.  -continue medications per Michelle Chavez CNP    Patient is stable. Will continue with House Call NP visits every 6 weeks and as needed.          AIMEE Delvalle-CNP

## 2024-04-29 NOTE — ASSESSMENT & PLAN NOTE
Lab Results   Component Value Date    HGBA1C 5.8 (H) 01/19/2024   No hypo/hyperglycemic episodes.   -no changes, continue to monitor

## 2024-04-29 NOTE — ASSESSMENT & PLAN NOTE
"Lab Results   Component Value Date    CHOL 206 (H) 03/20/2023     Lab Results   Component Value Date    HDL 71.2 03/20/2023     No results found for: \"LDLCALC\"  Lab Results   Component Value Date    TRIG 151 (H) 03/20/2023   BP stable, no chest pain.   -no changes, continue to monitor  "

## 2024-05-13 DIAGNOSIS — M62.838 MUSCLE SPASM: Primary | ICD-10-CM

## 2024-05-13 RX ORDER — TIZANIDINE 2 MG/1
2 TABLET ORAL 3 TIMES DAILY PRN
Qty: 120 TABLET | Refills: 0 | Status: SHIPPED | OUTPATIENT
Start: 2024-05-13

## 2024-06-06 ENCOUNTER — TELEPHONE (OUTPATIENT)
Dept: PRIMARY CARE | Facility: CLINIC | Age: 89
End: 2024-06-06
Payer: COMMERCIAL

## 2024-06-06 NOTE — TELEPHONE ENCOUNTER
TENZIN Ruiz - Phoned in follow up to confirm tomorrow's House Calls visit with Sara Elizabeth NP, no answer, LMOM.

## 2024-06-07 ENCOUNTER — OFFICE VISIT (OUTPATIENT)
Dept: PRIMARY CARE | Facility: CLINIC | Age: 89
End: 2024-06-07
Payer: COMMERCIAL

## 2024-06-07 VITALS
TEMPERATURE: 97.8 F | OXYGEN SATURATION: 98 % | HEART RATE: 57 BPM | SYSTOLIC BLOOD PRESSURE: 144 MMHG | DIASTOLIC BLOOD PRESSURE: 82 MMHG | RESPIRATION RATE: 16 BRPM

## 2024-06-07 DIAGNOSIS — R41.0 ACUTE CONFUSION: ICD-10-CM

## 2024-06-07 DIAGNOSIS — F41.9 ANXIETY: ICD-10-CM

## 2024-06-07 DIAGNOSIS — G20.A1 PARKINSON'S DISEASE, UNSPECIFIED WHETHER DYSKINESIA PRESENT, UNSPECIFIED WHETHER MANIFESTATIONS FLUCTUATE (MULTI): Primary | ICD-10-CM

## 2024-06-07 DIAGNOSIS — M15.9 PRIMARY OSTEOARTHRITIS INVOLVING MULTIPLE JOINTS: ICD-10-CM

## 2024-06-07 DIAGNOSIS — R13.10 DYSPHAGIA, UNSPECIFIED TYPE: ICD-10-CM

## 2024-06-07 DIAGNOSIS — F32.9 MAJOR DEPRESSIVE DISORDER, REMISSION STATUS UNSPECIFIED, UNSPECIFIED WHETHER RECURRENT: ICD-10-CM

## 2024-06-07 DIAGNOSIS — M62.838 MUSCLE SPASM: ICD-10-CM

## 2024-06-07 DIAGNOSIS — R05.1 ACUTE COUGH: ICD-10-CM

## 2024-06-07 PROCEDURE — 1159F MED LIST DOCD IN RCRD: CPT

## 2024-06-07 PROCEDURE — 1126F AMNT PAIN NOTED NONE PRSNT: CPT

## 2024-06-07 PROCEDURE — 1160F RVW MEDS BY RX/DR IN RCRD: CPT

## 2024-06-07 PROCEDURE — 99349 HOME/RES VST EST MOD MDM 40: CPT

## 2024-06-07 RX ORDER — LEVOFLOXACIN 250 MG/1
250 TABLET ORAL DAILY
Qty: 5 TABLET | Refills: 0 | Status: SHIPPED | OUTPATIENT
Start: 2024-06-07 | End: 2024-06-12

## 2024-06-07 RX ORDER — SERTRALINE HYDROCHLORIDE 100 MG/1
150 TABLET, FILM COATED ORAL NIGHTLY
Qty: 135 TABLET | Refills: 3 | Status: SHIPPED | OUTPATIENT
Start: 2024-06-07 | End: 2025-06-07

## 2024-06-07 ASSESSMENT — PAIN SCALES - GENERAL: PAINLEVEL: 0-NO PAIN

## 2024-06-07 NOTE — PROGRESS NOTES
Subjective   Patient ID: Rohini Butler is a 88 y.o. female who presents for Follow-up (Parkinson's, anxiety, depression, OA, muscle spasms).    HPI   Patient is seen in her private home today. She is alert and oriented to self only.   PMH: CAD, HLD, Parkinson's, GERD, anxiety, depression, OA    Patient seen today up in recliner with , Jhonatan, who is present for assessment. Patient is a very poor historian secondary to Parkinson's/ dementia; HPI and ROS were supplemented by patient's spouse and patient's chart. Spouse states that she is still napping during the day, but overall is more engaged during the day. Spouse reports her night time sleep remains somewhat fragmented, but he is uncertain how often she is awake because he goes to sleep at night. He states she does go to sleep when he puts her in her room, but is often awake if he gets up in the middle of the night and when he wakes up in the morning. She continues to follow with Michelle Chavez CNP with [facility] mental health services, no adjustments have been made with her medications since 2023.  Patient remains uable to walk but she continues to do stregthen exercises with spouse and caregivers. No recent falls reported. [facility] PT/OT services are no longer in place through [facility]. She continues to have private duty care givers 5-6 days a week for 5 hours at a time. The couple's 5 sons continue take turn throughout the week coming over to give addtional support to patient. Overall appetite and hydration status stable per spouse. He does report that he has noticed that it seems to take her more time to swallow pills in the last couple of weeks. He denies any coughing or choking with meals. No recently reported issues with bowel or bladder. Spouse continues to manage all of patient's medications and medical care.     Parkinson's: Does not follow with a neurologist. No changes in carbidopa-levodopa in     Bilateral Knee Pain/ OA/ Muscle Spasms  - Muscle relaxant given daily with some success. Patient and spouse states that stretching patient's legs and performing exercises that PT/OT did with patient help to keep her BLE contractions from getting worse.      Anxiety/ Depression - Patient continues to follow with Mental Health Services. Spouse continues to voice concerns about persistent depression and sleep disturbance.    Home Visit: Medically necessary due to dementia/cognitive impairment, unsteady gait/poor balance, and Illness or condition that results in activity limitation or restriction that impacts the ability to leave home such as: equipment and /or human assistance needed to safely leave the home, patient has limited support systems to help the patient attend office visits, the office visit would require excessive physical effort or pain for the patient.       Current Outpatient Medications:     acetaminophen (Tylenol) 500 mg tablet, 1-2 tablets as needed Orally every 8 hrs as needed, Disp: , Rfl:     carbidopa-levodopa (Sinemet)  mg tablet, 2 tabs in am; 2 tabs in afternoon; 1 tab at pm Orally Three times a day, Disp: 450 tablet, Rfl: 1    docusate sodium (Colace) 100 mg capsule, Take 2 capsules (200 mg) by mouth 2 times a day as needed for constipation., Disp: , Rfl:     gabapentin (Neurontin) 100 mg capsule, Take 1 capsule (100 mg) by mouth 3 times a day., Disp: , Rfl:     hydrOXYzine HCL (Atarax) 25 mg tablet, Take 1 tablet (25 mg) by mouth once daily as needed., Disp: , Rfl:     nystatin (Mycostatin) ointment, Apply 1 Application topically 2 times a day., Disp: , Rfl:     polyethylene glycol (Miralax) 17 gram/dose powder, as directed Orally Once a day, Disp: , Rfl:     sennosides-docusate sodium (Perlita-Colace) 8.6-50 mg tablet, Take 2 tablets by mouth 2 times a day., Disp: , Rfl:     sertraline (Zoloft) 100 mg tablet, Take 1.5 tablets (150 mg) by mouth once daily at bedtime., Disp: 135 tablet, Rfl: 3    tiZANidine (Zanaflex) 2 mg  tablet, Take 1 tablet (2 mg) by mouth 3 times a day as needed for muscle spasms., Disp: 120 tablet, Rfl: 0    levoFLOXacin (Levaquin) 250 mg tablet, Take 1 tablet (250 mg) by mouth once daily for 5 doses., Disp: 5 tablet, Rfl: 0    OneTouch Ultra Test strip, 1 strip once daily., Disp: , Rfl:      Review of Systems  Constitutional:  Negative for activity change, appetite change, chills, diaphoresis, fatigue, fever and unexpected weight change.   HENT:  Negative for congestion, rhinorrhea and sore throat.    Eyes:  Negative for pain, discharge and visual disturbance.   Respiratory:  Positive for cough. Negative for choking, shortness of breath and wheezing.    Cardiovascular:  Negative for chest pain, palpitations and leg swelling.   Gastrointestinal:  Negative for abdominal distention, abdominal pain, blood in stool, constipation, diarrhea, nausea and vomiting.   Genitourinary:  Negative for difficulty urinating, dysuria, flank pain and hematuria.   Musculoskeletal:  Positive for gait problem.        BLE contractures   Skin:  Positive for wound. Negative for pallor and rash.         cleft    Neurological:  Positive for speech difficulty. Negative for dizziness, tremors, seizures, syncope, light-headedness, numbness and headaches.   Hematological:  Does not bruise/bleed easily.   Psychiatric/Behavioral:  Positive for sleep disturbance.    Patient is usually awake for most of the night, and sleeps during the day. Her sleep cycle has been reversed for many years.  Objective   /82 (BP Location: Left arm, Patient Position: Sitting, BP Cuff Size: Adult)   Pulse 57   Temp 36.6 °C (97.8 °F) (Temporal)   Resp 16   SpO2 98% Comment: room air  Lab Results   Component Value Date    HGBA1C 5.8 (H) 2024      Lab Results   Component Value Date    WBC 8.6 2024    HGB 13.5 2024    HCT 42.6 2024    MCV 99 2024     2024      Lab Results   Component Value Date    GLUCOSE 156 (H)  2024    CALCIUM 9.6 2024     (L) 2024    K 4.5 2024    CO2 28 2024    CL 95 (L) 2024    BUN 19 2024    CREATININE 0.59 2024      Physical Exam  Constitutional:       General: She is not in acute distress.     Comments: Chronically ill, frail-looking   HENT:      Head: Normocephalic.      Right Ear: External ear normal.      Left Ear: External ear normal.      Nose: No congestion or rhinorrhea.      Mouth/Throat:      Mouth: Mucous membranes are moist.   Eyes:      General: No scleral icterus.     Pupils: Pupils are equal, round, and reactive to light.   Cardiovascular:      Rate and Rhythm: Normal rate and regular rhythm.      Heart sounds: Murmur heard.   Pulmonary:      Effort: Pulmonary effort is normal. No respiratory distress.      Breath sounds: No wheezing or rales.   Abdominal:      Palpations: Abdomen is soft.      Tenderness: There is no abdominal tenderness. There is no guarding.   Musculoskeletal:      Right lower leg: No edema.      Left lower leg: No edema.      Comments: BLE with contractures. All extremities have a general stiffness.    Skin:     General: Skin is warm and dry.      Coloration: Skin is pale.      Findings: No rash.      Comments: Did not visualize catherine area. Small area of open skin in the  cleft, per .     Neurological:      Mental Status: She is alert. Mental status is at baseline.      Comments: Able to answer with yes or no, follows simple commands. At baseline per     Psychiatric:      Comments: No agitation, no behavioral problems noted during this visit   Assessment/Plan   Diagnoses and all orders for this visit:  Parkinson's disease, unspecified whether dyskinesia present, unspecified whether manifestations fluctuate (Multi)  Dysphagia, unspecified type  Acute cough  No reported falls. Spouse continues to report concerns with excessive daytime sleepiness. Previously followed with Paulo Marroquin MD  "(Neurology), but has not seen since March 2022. Spouse does not desire to schedule an appt at this time, would prefer I manage her medications.   Spouse is not consistent with the timing of carbidopa-levodopa administration, states \"I try my best\".  Some difficulty with swallowing pills. Concern for silent aspiration.    -carbidopa-levodopa (Sinemet)  mg tablet; 2 tabs in am; 2 tabs in afternoon; 1 tab at pm Orally Three times a day  -review medications, consider adjusting/discontinuing sertraline (SSRIs have been known to cause sleep disturbance in PD) and daytime dosing of gabapentin.   -education provided Aspiration Precautions  -levoFLOXacin (Levaquin) 250 mg tablet; Take 1 tablet (250 mg) by mouth once daily for 5 doses.  -encouraged spouse to thicken liquids to honey consistency, instructed on aspiration precautions.   Acute confusion  -     Urinalysis with [facility]; Future  Anxiety  Major depressive disorder, remission status unspecified, unspecified whether recurrent  -     sertraline (Zoloft) 100 mg tablet; Take 1.5 tablets (150 mg) by mouth once daily at bedtime.  Primary osteoarthritis involving multiple joints  Muscle spasm  -continue prn tinazidine for spasms   -continue strengthening exercises with encouragement from home health aids.     Spouse reports that patient is doing much better the past few weeks, more alert and engaged during the day. I have concern for silent aspiration with meals. Detailed instruction r/t aspiration precautions provided to both patient and spouse. Will follow up in two weeks with a House Calls visit.            Sara Elizabeth, APRN-CNP    "

## 2024-06-09 PROBLEM — R41.0 ACUTE CONFUSION: Status: ACTIVE | Noted: 2024-06-09

## 2024-06-09 PROBLEM — R05.1 ACUTE COUGH: Status: ACTIVE | Noted: 2024-06-09

## 2024-06-10 ENCOUNTER — LAB (OUTPATIENT)
Dept: LAB | Facility: LAB | Age: 89
End: 2024-06-10
Payer: COMMERCIAL

## 2024-06-10 DIAGNOSIS — R41.0 ACUTE CONFUSION: ICD-10-CM

## 2024-06-10 LAB
APPEARANCE UR: CLEAR
BILIRUB UR STRIP.AUTO-MCNC: NEGATIVE MG/DL
COLOR UR: NORMAL
GLUCOSE UR STRIP.AUTO-MCNC: NORMAL MG/DL
KETONES UR STRIP.AUTO-MCNC: NEGATIVE MG/DL
LEUKOCYTE ESTERASE UR QL STRIP.AUTO: NEGATIVE
NITRITE UR QL STRIP.AUTO: NEGATIVE
PH UR STRIP.AUTO: 7.5 [PH]
PROT UR STRIP.AUTO-MCNC: NEGATIVE MG/DL
RBC # UR STRIP.AUTO: NEGATIVE /UL
SP GR UR STRIP.AUTO: 1.01
UROBILINOGEN UR STRIP.AUTO-MCNC: NORMAL MG/DL

## 2024-06-10 PROCEDURE — 81003 URINALYSIS AUTO W/O SCOPE: CPT

## 2024-06-20 ENCOUNTER — TELEPHONE (OUTPATIENT)
Dept: PRIMARY CARE | Facility: CLINIC | Age: 89
End: 2024-06-20
Payer: COMMERCIAL

## 2024-06-21 ENCOUNTER — LAB (OUTPATIENT)
Dept: LAB | Facility: LAB | Age: 89
End: 2024-06-21
Payer: COMMERCIAL

## 2024-06-21 ENCOUNTER — OFFICE VISIT (OUTPATIENT)
Dept: PRIMARY CARE | Facility: CLINIC | Age: 89
End: 2024-06-21
Payer: COMMERCIAL

## 2024-06-21 VITALS
HEART RATE: 68 BPM | TEMPERATURE: 97.8 F | DIASTOLIC BLOOD PRESSURE: 68 MMHG | RESPIRATION RATE: 17 BRPM | OXYGEN SATURATION: 98 % | SYSTOLIC BLOOD PRESSURE: 118 MMHG

## 2024-06-21 DIAGNOSIS — M62.838 MUSCLE SPASM: ICD-10-CM

## 2024-06-21 DIAGNOSIS — E55.9 VITAMIN D DEFICIENCY: ICD-10-CM

## 2024-06-21 DIAGNOSIS — I77.9 CAROTID ARTERY DISEASE, UNSPECIFIED LATERALITY, UNSPECIFIED TYPE (CMS-HCC): ICD-10-CM

## 2024-06-21 DIAGNOSIS — M15.9 PRIMARY OSTEOARTHRITIS INVOLVING MULTIPLE JOINTS: ICD-10-CM

## 2024-06-21 DIAGNOSIS — F41.9 ANXIETY: ICD-10-CM

## 2024-06-21 DIAGNOSIS — E78.5 HYPERLIPIDEMIA, UNSPECIFIED HYPERLIPIDEMIA TYPE: ICD-10-CM

## 2024-06-21 DIAGNOSIS — I25.10 CORONARY ARTERIOSCLEROSIS: ICD-10-CM

## 2024-06-21 DIAGNOSIS — F32.9 MAJOR DEPRESSIVE DISORDER, REMISSION STATUS UNSPECIFIED, UNSPECIFIED WHETHER RECURRENT: ICD-10-CM

## 2024-06-21 DIAGNOSIS — E11.9 TYPE 2 DIABETES MELLITUS WITHOUT COMPLICATION, WITHOUT LONG-TERM CURRENT USE OF INSULIN (MULTI): ICD-10-CM

## 2024-06-21 DIAGNOSIS — G20.A1 PARKINSON'S DISEASE, UNSPECIFIED WHETHER DYSKINESIA PRESENT, UNSPECIFIED WHETHER MANIFESTATIONS FLUCTUATE (MULTI): Primary | ICD-10-CM

## 2024-06-21 DIAGNOSIS — R13.10 DYSPHAGIA, UNSPECIFIED TYPE: ICD-10-CM

## 2024-06-21 PROBLEM — R05.1 ACUTE COUGH: Status: RESOLVED | Noted: 2024-06-09 | Resolved: 2024-06-21

## 2024-06-21 LAB
ALBUMIN SERPL BCP-MCNC: 3.7 G/DL (ref 3.4–5)
ALP SERPL-CCNC: 68 U/L (ref 33–136)
ALT SERPL W P-5'-P-CCNC: 3 U/L (ref 7–45)
ANION GAP SERPL CALC-SCNC: 14 MMOL/L (ref 10–20)
AST SERPL W P-5'-P-CCNC: 11 U/L (ref 9–39)
BASOPHILS # BLD AUTO: 0.04 X10*3/UL (ref 0–0.1)
BASOPHILS NFR BLD AUTO: 0.4 %
BILIRUB SERPL-MCNC: 0.4 MG/DL (ref 0–1.2)
BUN SERPL-MCNC: 20 MG/DL (ref 6–23)
CALCIUM SERPL-MCNC: 9.4 MG/DL (ref 8.6–10.3)
CHLORIDE SERPL-SCNC: 98 MMOL/L (ref 98–107)
CHOLEST SERPL-MCNC: 218 MG/DL (ref 0–199)
CHOLESTEROL/HDL RATIO: 2.9
CO2 SERPL-SCNC: 27 MMOL/L (ref 21–32)
CREAT SERPL-MCNC: 0.67 MG/DL (ref 0.5–1.05)
EGFRCR SERPLBLD CKD-EPI 2021: 85 ML/MIN/1.73M*2
EOSINOPHIL # BLD AUTO: 0.08 X10*3/UL (ref 0–0.4)
EOSINOPHIL NFR BLD AUTO: 0.9 %
ERYTHROCYTE [DISTWIDTH] IN BLOOD BY AUTOMATED COUNT: 14.3 % (ref 11.5–14.5)
GLUCOSE SERPL-MCNC: 81 MG/DL (ref 74–99)
HCT VFR BLD AUTO: 41.1 % (ref 36–46)
HDLC SERPL-MCNC: 75 MG/DL
HGB BLD-MCNC: 12.7 G/DL (ref 12–16)
IMM GRANULOCYTES # BLD AUTO: 0.02 X10*3/UL (ref 0–0.5)
IMM GRANULOCYTES NFR BLD AUTO: 0.2 % (ref 0–0.9)
LDLC SERPL CALC-MCNC: 123 MG/DL
LYMPHOCYTES # BLD AUTO: 1.1 X10*3/UL (ref 0.8–3)
LYMPHOCYTES NFR BLD AUTO: 12.3 %
MCH RBC QN AUTO: 30.1 PG (ref 26–34)
MCHC RBC AUTO-ENTMCNC: 30.9 G/DL (ref 32–36)
MCV RBC AUTO: 97 FL (ref 80–100)
MONOCYTES # BLD AUTO: 0.67 X10*3/UL (ref 0.05–0.8)
MONOCYTES NFR BLD AUTO: 7.5 %
NEUTROPHILS # BLD AUTO: 7.02 X10*3/UL (ref 1.6–5.5)
NEUTROPHILS NFR BLD AUTO: 78.7 %
NON HDL CHOLESTEROL: 143 MG/DL (ref 0–149)
NRBC BLD-RTO: 0 /100 WBCS (ref 0–0)
PLATELET # BLD AUTO: 276 X10*3/UL (ref 150–450)
POTASSIUM SERPL-SCNC: 4.4 MMOL/L (ref 3.5–5.3)
PROT SERPL-MCNC: 6.5 G/DL (ref 6.4–8.2)
RBC # BLD AUTO: 4.22 X10*6/UL (ref 4–5.2)
SODIUM SERPL-SCNC: 135 MMOL/L (ref 136–145)
TRIGL SERPL-MCNC: 99 MG/DL (ref 0–149)
TSH SERPL-ACNC: 0.88 MIU/L (ref 0.44–3.98)
VLDL: 20 MG/DL (ref 0–40)
WBC # BLD AUTO: 8.9 X10*3/UL (ref 4.4–11.3)

## 2024-06-21 PROCEDURE — 82306 VITAMIN D 25 HYDROXY: CPT

## 2024-06-21 PROCEDURE — 80061 LIPID PANEL: CPT

## 2024-06-21 PROCEDURE — 1126F AMNT PAIN NOTED NONE PRSNT: CPT

## 2024-06-21 PROCEDURE — 85025 COMPLETE CBC W/AUTO DIFF WBC: CPT

## 2024-06-21 PROCEDURE — 1160F RVW MEDS BY RX/DR IN RCRD: CPT

## 2024-06-21 PROCEDURE — 1159F MED LIST DOCD IN RCRD: CPT

## 2024-06-21 PROCEDURE — 36415 COLL VENOUS BLD VENIPUNCTURE: CPT

## 2024-06-21 PROCEDURE — 80053 COMPREHEN METABOLIC PANEL: CPT

## 2024-06-21 PROCEDURE — 84443 ASSAY THYROID STIM HORMONE: CPT

## 2024-06-21 PROCEDURE — 83036 HEMOGLOBIN GLYCOSYLATED A1C: CPT

## 2024-06-21 PROCEDURE — 99349 HOME/RES VST EST MOD MDM 40: CPT

## 2024-06-21 ASSESSMENT — PAIN SCALES - GENERAL: PAINLEVEL: 0-NO PAIN

## 2024-06-21 NOTE — PROGRESS NOTES
Subjective   Patient ID: Rohini Butler is a 88 y.o. female who presents for follow up for acute confusion and other chronic medical conditions.    HPI   Patient is seen in her private home today. She is alert and oriented to self only. She remains homebound due to mobility issues and advancing dementia.     PMH: CAD, HLD, Parkinson's, GERD, anxiety, depression, OA     Patient seen today up in recliner with , Jhonatan, who is present for assessment. Patient is a very poor historian secondary to Parkinson's/ dementia; HPI and ROS were supplemented by patient's spouse and patient's chart. Spouse states that she is still napping during the day, but he feels she is doing better, states she has been cooperative with her exercises and engaged during the day. Spouse reports her night time sleep remains somewhat fragmented, but he is uncertain how often she is awake because he goes to sleep at night. He states she does go to sleep when he puts her in her room, but is often awake if he gets up in the middle of the night and when he wakes up in the morning. She continues to follow with Michelle Chavez CNP with [facility] for mental health services, no adjustments have been made with her medications since 2023.  Patient remains uable to walk but she continues to do stregthen exercises with spouse and caregivers. No recent falls reported. [facility] PT/OT services are no longer in place through [facility]. She continues to have private duty care givers 5-6 days a week for 5 hours at a time. The couple's 5 sons continue take turn throughout the week coming over to give addtional support to patient. Overall appetite and hydration status stable per spouse. Her caregiver reports that she has a goal for the patient to drink 5 glasses (8 oz) of water per day. Both the caregiver and Jhonatan report that it seems to take her more time to swallow pills in the last couple of weeks, but neither have noted any coughing with swallowing  or choking with meals. No recently reported issues with bowel or bladder. Spouse continues to manage all of patient's medications and medical care.    Acute confusion: completed course of levofloxacin 250 mg related to concern for infection (silent aspiration vs UTI), no reported issues with antibiotic. UA negative. Spouse reports confusion has returned to baseline. No cough, respiratory symptoms or urinary symptoms reported.     Parkinson's: Does not follow with a neurologist. No changes in carbidopa-levodopa, spouse states that he does give her all doses, but the timing is not always consistent from day to day.     Bilateral Knee Pain/ OA/ Muscle Spasms - Muscle relaxant given daily with some success. Caregiver and spouse state that stretching patient's legs and performing exercises that PT/OT did with patient help to keep her BLE contractions from getting worse.      Anxiety/ Depression - Patient continues to follow with Mental Health Services. Spouse reports that he feels patient's mood is stable.       Home Visit: Medically necessary due to dementia/cognitive impairment, unsteady gait/poor balance, and Illness or condition that results in activity limitation or restriction that impacts the ability to leave home such as: equipment and /or human assistance needed to safely leave the home, patient has limited support systems to help the patient attend office visits, the office visit would require excessive physical effort or pain for the patient.       Current Outpatient Medications:     acetaminophen (Tylenol) 500 mg tablet, 1-2 tablets as needed Orally every 8 hrs as needed, Disp: , Rfl:     carbidopa-levodopa (Sinemet)  mg tablet, 2 tabs in am; 2 tabs in afternoon; 1 tab at pm Orally Three times a day, Disp: 450 tablet, Rfl: 1    docusate sodium (Colace) 100 mg capsule, Take 2 capsules (200 mg) by mouth 2 times a day as needed for constipation., Disp: , Rfl:     gabapentin (Neurontin) 100 mg capsule, Take  "1 capsule (100 mg) by mouth 3 times a day., Disp: , Rfl:     hydrOXYzine HCL (Atarax) 25 mg tablet, Take 1 tablet (25 mg) by mouth once daily as needed., Disp: , Rfl:     nystatin (Mycostatin) ointment, Apply 1 Application topically 2 times a day., Disp: , Rfl:     OneTouch Ultra Test strip, 1 strip once daily., Disp: , Rfl:     polyethylene glycol (Miralax) 17 gram/dose powder, as directed Orally Once a day, Disp: , Rfl:     sennosides-docusate sodium (Perlita-Colace) 8.6-50 mg tablet, Take 2 tablets by mouth 2 times a day., Disp: , Rfl:     sertraline (Zoloft) 100 mg tablet, Take 1.5 tablets (150 mg) by mouth once daily at bedtime., Disp: 135 tablet, Rfl: 3    tiZANidine (Zanaflex) 2 mg tablet, Take 1 tablet (2 mg) by mouth 3 times a day as needed for muscle spasms., Disp: 120 tablet, Rfl: 0     Review of Systems  Constitutional:  Negative for activity change, appetite change, chills, diaphoresis, fever and unexpected weight change.   Patient states \"I am just so tired\"  HENT:  Negative for congestion, rhinorrhea and sore throat.    Eyes:  Negative for pain, discharge and visual disturbance.   Respiratory:  Positive for cough. Negative for choking, shortness of breath and wheezing.    Cardiovascular:  Negative for chest pain, palpitations and leg swelling.   Gastrointestinal:  Negative for abdominal distention, abdominal pain, blood in stool, constipation, diarrhea, nausea and vomiting.   Genitourinary:  Negative for difficulty urinating, dysuria, flank pain and hematuria.   Musculoskeletal:  Positive for gait problem.        BLE contractures   Skin:  Positive for wound. Negative for pallor and rash.         cleft    Neurological:  Positive for speech difficulty. Negative for dizziness, tremors, seizures, syncope, light-headedness, numbness and headaches.   Hematological:  Does not bruise/bleed easily.   Psychiatric/Behavioral:  Positive for sleep disturbance.    Patient is usually awake for most of the night, " and sleeps during the day per . Her sleep cycle has been reversed for many years per medical record notes.  Caregiver reports that patient is having increasing episodes of anxiety/tearfulness, is also concerned the patient is hallucinating at times. Spouse has not noted any hallucinations.  Objective   /68   Pulse 68   Temp 36.6 °C (97.8 °F) (Temporal)   Resp 17   SpO2 98% Comment: room air  Lab Results   Component Value Date    HGBA1C 5.8 (H) 01/19/2024      Lab Results   Component Value Date    WBC 8.6 01/19/2024    HGB 13.5 01/19/2024    HCT 42.6 01/19/2024    MCV 99 01/19/2024     01/19/2024      Lab Results   Component Value Date    GLUCOSE 156 (H) 01/19/2024    CALCIUM 9.6 01/19/2024     (L) 01/19/2024    K 4.5 01/19/2024    CO2 28 01/19/2024    CL 95 (L) 01/19/2024    BUN 19 01/19/2024    CREATININE 0.59 01/19/2024      Physical Exam  Constitutional:       General: She is not in acute distress.     Comments: Chronically ill, frail-looking, but well-groomed and somewhat engaged during this visit.  HENT:      Head: Normocephalic.      Right Ear: External ear normal.      Left Ear: External ear normal.      Nose: No congestion or rhinorrhea.      Mouth/Throat:      Mouth: Mucous membranes are moist.   Eyes:      General: No scleral icterus.     Pupils: Pupils are equal, round, and reactive to light.   Cardiovascular:      Rate and Rhythm: Normal rate and regular rhythm.      Heart sounds: Murmur heard.   Pulmonary:      Effort: Pulmonary effort is normal. No respiratory distress.      Breath sounds: No wheezing or rales.   Abdominal:      Palpations: Abdomen is soft.      Tenderness: There is no abdominal tenderness. There is no guarding.   Musculoskeletal:      Right lower leg: No edema.      Left lower leg: No edema.      Comments: BLE with contractures. All extremities have a general stiffness.    Skin:     General: Skin is warm and dry.      Coloration: Skin is pale.       "Findings: No rash.      Comments: Did not visualize catherine area. Small area of open skin in the  cleft, per .     Neurological:      Mental Status: She is alert. Mental status is at baseline.      Comments: Able to answer with yes or no, follows simple commands. At baseline per . Makes eye contact, but falls asleep often during assessment.  Psychiatric:      Comments: No agitation, no behavioral problems noted during this visit   Assessment/Plan   Diagnoses and all orders for this visit:  Parkinson's disease, unspecified whether dyskinesia present, unspecified whether manifestations fluctuate (Multi)  Dysphagia, unspecified type  No reported falls. Spouse states patient is still napping, but more engaged while awake. Previously followed with Paulo Marroquin MD (Neurology), but has not seen since 2022. Spouse does not desire to schedule an appt at this time, would prefer I manage her medications.   Spouse is not consistent with the timing of carbidopa-levodopa administration, states \"I try my best\".  Some difficulty with swallowing pills. Concern for silent aspiration. Spouse is not compliant with thickening liquids.   -continue carbidopa-levodopa (Sinemet)  mg tablet; 2 tabs in am; 2 tabs in afternoon; 1 tab at pm Orally Three times a day  -review medications, consider adjusting/discontinuing sertraline (SSRIs have been known to cause sleep disturbance in PD) and daytime dosing of gabapentin.   -education provided Aspiration Precautions  -instructed spouse to thicken liquids to honey consistency, crush medications and administer with applesauce/pudding/yogurt.  nstructed on aspiration precautions.   Coronary arteriosclerosis  Carotid artery disease, unspecified laterality, unspecified type (CMS-[facility])  Hyperlipidemia, unspecified hyperlipidemia type  Chronic, stable.   -Lipid panel; Future  Type 2 diabetes mellitus without complication, without long-term current use of insulin " (Multi)  History of. Does not require antidiabetic medications or insulin at this time.  -CBC and Auto Differential; Future  -Comprehensive metabolic panel; Future  -Tsh With Reflex To Free T4 If Abnormal; Future  -Hemoglobin A1c; Future  Anxiety  Major depressive disorder, remission status unspecified, unspecified whether recurrent  Mood is stable. Follows with JOSH Garcia with [facility]  -continue sertraline 150 mg and prn hydroxyzine 25 mg   Primary osteoarthritis involving multiple joints  Muscle spasm  Chronic, stable.  -continue prn tinazidine for spasms   -continue strengthening exercises with encouragement from home health aids.  Vitamin D deficiency  -Vitamin D 25-Hydroxy,Total (for eval of Vitamin D levels); Future    Patient remains stable. No coughing or choking reported by spouse. Spouse has not used thickener for liquids, encouraged to do so to help prevent silent aspiration during oral intake. Encouraged spouse to crush medications and administer in applesauce or pudding. Labs obtained today, patient tolerated with minimal discomfort. Will continue scheduled House Call NP visits due to non-ambulatory status and advancing dementia.            Sara Elizabeth, APRN-CNP

## 2024-06-22 ENCOUNTER — TELEPHONE (OUTPATIENT)
Dept: PRIMARY CARE | Facility: CLINIC | Age: 89
End: 2024-06-22
Payer: COMMERCIAL

## 2024-06-22 LAB
25(OH)D3 SERPL-MCNC: 35 NG/ML (ref 30–100)
EST. AVERAGE GLUCOSE BLD GHB EST-MCNC: 120 MG/DL
HBA1C MFR BLD: 5.8 %

## 2024-06-24 PROBLEM — B02.29 POSTHERPETIC NEURALGIA: Status: RESOLVED | Noted: 2023-11-20 | Resolved: 2024-06-24

## 2024-06-24 PROBLEM — R41.0 ACUTE CONFUSION: Status: RESOLVED | Noted: 2024-06-09 | Resolved: 2024-06-24

## 2024-06-24 PROBLEM — G47.00 INSOMNIA, UNSPECIFIED: Status: RESOLVED | Noted: 2021-01-26 | Resolved: 2024-06-24

## 2024-06-24 NOTE — TELEPHONE ENCOUNTER
Patients caregiver Stacie Teague called Saturday morning at 10 AM reporting that patient had right ear pain and a sore throat. Her symptoms started when she woke up. There was no fever, chills, sinus drainage, cough, shortness of breath. Patient did not have any acute concerns the night before. Her family gave her Tylenol about 10 minutes before they called. I advised caregiver/family to monitor Rohini for any worsening concerns and to try Tylenol as needed. I did not prescribe anything as her symptoms started 2 hours prior.

## 2024-06-25 ENCOUNTER — TELEPHONE (OUTPATIENT)
Dept: PRIMARY CARE | Facility: CLINIC | Age: 89
End: 2024-06-25
Payer: COMMERCIAL

## 2024-06-25 NOTE — TELEPHONE ENCOUNTER
Called spouse to follow up on Rohini, no answer, left message to please call office with an update.

## 2024-06-26 ENCOUNTER — TELEPHONE (OUTPATIENT)
Dept: PRIMARY CARE | Facility: CLINIC | Age: 89
End: 2024-06-26
Payer: COMMERCIAL

## 2024-08-13 ENCOUNTER — TELEPHONE (OUTPATIENT)
Dept: PRIMARY CARE | Facility: CLINIC | Age: 89
End: 2024-08-13
Payer: COMMERCIAL

## 2024-08-14 ENCOUNTER — OFFICE VISIT (OUTPATIENT)
Dept: PRIMARY CARE | Facility: CLINIC | Age: 89
End: 2024-08-14
Payer: COMMERCIAL

## 2024-08-14 VITALS
SYSTOLIC BLOOD PRESSURE: 115 MMHG | TEMPERATURE: 97.8 F | DIASTOLIC BLOOD PRESSURE: 58 MMHG | RESPIRATION RATE: 22 BRPM | HEART RATE: 71 BPM | OXYGEN SATURATION: 97 %

## 2024-08-14 DIAGNOSIS — I77.9 CAROTID ARTERY DISEASE, UNSPECIFIED LATERALITY, UNSPECIFIED TYPE (CMS-HCC): ICD-10-CM

## 2024-08-14 DIAGNOSIS — I25.10 CORONARY ARTERIOSCLEROSIS: ICD-10-CM

## 2024-08-14 DIAGNOSIS — F32.9 MAJOR DEPRESSIVE DISORDER, REMISSION STATUS UNSPECIFIED, UNSPECIFIED WHETHER RECURRENT: ICD-10-CM

## 2024-08-14 DIAGNOSIS — E11.9 TYPE 2 DIABETES MELLITUS WITHOUT COMPLICATION, WITHOUT LONG-TERM CURRENT USE OF INSULIN (MULTI): ICD-10-CM

## 2024-08-14 DIAGNOSIS — R13.10 DYSPHAGIA, UNSPECIFIED TYPE: ICD-10-CM

## 2024-08-14 DIAGNOSIS — M15.9 PRIMARY OSTEOARTHRITIS INVOLVING MULTIPLE JOINTS: ICD-10-CM

## 2024-08-14 DIAGNOSIS — G20.A1 PARKINSON'S DISEASE, UNSPECIFIED WHETHER DYSKINESIA PRESENT, UNSPECIFIED WHETHER MANIFESTATIONS FLUCTUATE (MULTI): Primary | ICD-10-CM

## 2024-08-14 DIAGNOSIS — E78.5 HYPERLIPIDEMIA, UNSPECIFIED HYPERLIPIDEMIA TYPE: ICD-10-CM

## 2024-08-14 DIAGNOSIS — F41.9 ANXIETY: ICD-10-CM

## 2024-08-14 DIAGNOSIS — M62.838 MUSCLE SPASM: ICD-10-CM

## 2024-08-14 PROCEDURE — 1159F MED LIST DOCD IN RCRD: CPT

## 2024-08-14 PROCEDURE — 1036F TOBACCO NON-USER: CPT

## 2024-08-14 PROCEDURE — 1160F RVW MEDS BY RX/DR IN RCRD: CPT

## 2024-08-14 PROCEDURE — 99349 HOME/RES VST EST MOD MDM 40: CPT

## 2024-08-14 PROCEDURE — 1126F AMNT PAIN NOTED NONE PRSNT: CPT

## 2024-08-14 ASSESSMENT — PAIN SCALES - GENERAL: PAINLEVEL: 0-NO PAIN

## 2024-08-14 NOTE — PROGRESS NOTES
Subjective   Patient ID: Rohini Butler is a 88 y.o. female who presents for Follow-up (Parkinson's Disease, CAD, HLD, GERD, anxiety, depression).    HPI   Patient is seen in her private home today. She is alert and oriented to self only. She remains homebound due to mobility issues and advancing dementia.      PMH: CAD, HLD, Parkinson's, GERD, anxiety, depression, OA     Patient seen today up in recliner in her second story bedroom. Her caregiver is present for assessment. Patient is a very poor historian secondary to Parkinson's/ dementia; HPI and ROS were supplemented by caregiver and patient's chart. Caregiver reports she has been cooperative with her exercises and engaged during the day. She states that  She continues to follow with Michelle Chavez CNP with [facility] for mental health services.  Patient remains uable to walk but she continues to do stregthen exercises with spouse and caregivers. No recent falls reported. [facility] PT/OT services are no longer in place through [facility]. She continues to have private duty care givers 5-6 days a week for 5 hours at a time. The couple's 5 sons continue take turn throughout the week coming over to give addtional support to patient. Overall appetite and hydration status stable per caregiver. Her caregiver reports that she has a goal for the patient to drink 5 glasses (8 oz) of water per day. The caregiver reports that Jhonatan (patient's spouse) expressed concern that patient was drooling for approximately 30 minutes last week, but neither have noted any coughing with swallowing or choking with meals. No recently reported issues with bowel or bladder. Spouse continues to manage all of patient's medications and medical care.      Parkinson's: Does not follow with a neurologist. No changes in carbidopa-levodopa, spouse states that he does give her all doses, but the timing is not always consistent from day to day.     Bilateral Knee Pain/ OA/ Muscle Spasms - Muscle  relaxant given daily with some success. Caregiver states that stretching patient's legs and performing exercises that PT/OT did with patient help to keep her BLE contractions from getting worse.      Anxiety/ Depression - Patient continues to follow with Mental Health Services. Caregiver reports that he feels patient's mood is stable.       Home Visit: Medically necessary due to dementia/cognitive impairment, unsteady gait/poor balance, and Illness or condition that results in activity limitation or restriction that impacts the ability to leave home such as: equipment and /or human assistance needed to safely leave the home, patient has limited support systems to help the patient attend office visits, the office visit would require excessive physical effort or pain for the patient.       Current Outpatient Medications:     acetaminophen (Tylenol) 500 mg tablet, 1-2 tablets as needed Orally every 8 hrs as needed, Disp: , Rfl:     carbidopa-levodopa (Sinemet)  mg tablet, 2 tabs in am; 2 tabs in afternoon; 1 tab at pm Orally Three times a day, Disp: 450 tablet, Rfl: 1    docusate sodium (Colace) 100 mg capsule, Take 2 capsules (200 mg) by mouth 2 times a day as needed for constipation., Disp: , Rfl:     gabapentin (Neurontin) 100 mg capsule, Take 1 capsule (100 mg) by mouth 3 times a day., Disp: , Rfl:     hydrOXYzine HCL (Atarax) 25 mg tablet, Take 1 tablet (25 mg) by mouth once daily as needed., Disp: , Rfl:     polyethylene glycol (Miralax) 17 gram/dose powder, as directed Orally Once a day, Disp: , Rfl:     sennosides-docusate sodium (Perlita-Colace) 8.6-50 mg tablet, Take 2 tablets by mouth 2 times a day., Disp: , Rfl:     sertraline (Zoloft) 100 mg tablet, Take 1.5 tablets (150 mg) by mouth once daily at bedtime., Disp: 135 tablet, Rfl: 3    tiZANidine (Zanaflex) 2 mg tablet, Take 1 tablet (2 mg) by mouth 3 times a day as needed for muscle spasms., Disp: 120 tablet, Rfl: 0    nystatin (Mycostatin) ointment,  "Apply 1 Application topically 2 times a day., Disp: , Rfl:     OneTouch Ultra Test strip, 1 strip once daily., Disp: , Rfl:      Review of Systems  Constitutional:  Negative for activity change, appetite change, chills, diaphoresis, fever and unexpected weight change.   Patient states \"I am still pretty tired\"  HENT:  Negative for congestion, rhinorrhea and sore throat.    Eyes:  Negative for pain, discharge and visual disturbance.   Respiratory:  Positive for cough. Negative for choking, shortness of breath and wheezing.    Cardiovascular:  Negative for chest pain, palpitations and leg swelling.   Gastrointestinal:  Negative for abdominal distention, abdominal pain, blood in stool, constipation, diarrhea, nausea and vomiting.   Genitourinary:  Negative for difficulty urinating, dysuria, flank pain and hematuria.   Musculoskeletal:  Positive for gait problem.        BLE contractures   Skin:  Negative for pallor and rash.         Neurological:  Positive for speech difficulty. Negative for dizziness, tremors, seizures, syncope, light-headedness, numbness and headaches.   Hematological:  Does not bruise/bleed easily.   Psychiatric/Behavioral:  Positive for sleep disturbance.    Patient is usually awake for most of the night, and sleeps during the day per . Her sleep cycle has been reversed for many years per medical record notes.  Caregiver reports that patient has been in a better mood the last several weeks, not as tearful.     Objective   /58   Pulse 71   Temp 36.6 °C (97.8 °F)   Resp 22   LMP  (LMP Unknown)   SpO2 97% Comment: room air  Lab Results   Component Value Date    HGBA1C 5.8 (H) 06/21/2024      Lab Results   Component Value Date    WBC 8.9 06/21/2024    HGB 12.7 06/21/2024    HCT 41.1 06/21/2024    MCV 97 06/21/2024     06/21/2024      Lab Results   Component Value Date    GLUCOSE 81 06/21/2024    CALCIUM 9.4 06/21/2024     (L) 06/21/2024    K 4.4 06/21/2024    CO2 27 " "2024    CL 98 2024    BUN 20 2024    CREATININE 0.67 2024       Physical Exam  Constitutional:       General: She is not in acute distress.     Comments: Chronically ill, frail-looking, but well-groomed and somewhat engaged during this visit.  HENT:      Head: Normocephalic.      Right Ear: External ear normal.      Left Ear: External ear normal.      Nose: No congestion or rhinorrhea.      Mouth/Throat:      Mouth: Mucous membranes are moist.   Eyes:      General: No scleral icterus.     Pupils: Pupils are equal, round, and reactive to light.   Cardiovascular:      Rate and Rhythm: Normal rate and regular rhythm.      Heart sounds: Murmur heard.   Pulmonary:      Effort: Pulmonary effort is normal. No respiratory distress.      Breath sounds: No wheezing or rales.   Abdominal:      Palpations: Abdomen is soft.      Tenderness: There is no abdominal tenderness. There is no guarding.   Musculoskeletal:      Right lower leg: No edema.      Left lower leg: No edema.      Comments: BLE with contractures. All extremities have a general stiffness. BUE 4/5, BLE 3/5.  Skin:     General: Skin is warm and dry.      Coloration: Skin is pale.      Findings: No rash.      Comments: Did not visualize catherine area. Concern in warren cleft has resolved per caregiver.  Neurological:      Mental Status: She is alert. Mental status is at baseline.      Comments: Able to answer with yes or no, follows simple commands. MAEx4 with equal strength. Makes eye contact, engaged, smiles, during assessment. States \"winter\" when given a choice between summer and winter. Correctly states name and birthday.  Psychiatric:      Comments: No agitation, no behavioral problems noted during this visit   Assessment/Plan   Parkinson's disease, unspecified whether dyskinesia present, unspecified whether manifestations fluctuate (Multi)  Dysphagia, unspecified type  No reported falls. Caregiver states patient is still napping, but more " engaged while awake. Previously followed with Paulo Marroquin MD (Neurology), but has not seen since March 2022. Spouse does not desire to schedule an appt at this time, would prefer I manage her medications.   Some difficulty with swallowing pills. Concern for silent aspiration. Spouse and caregiver are not compliant with thickening liquids. Concern for drooling episode that lasted approximately 30 minutes, then resolved. No overt coughing or choking, caregiver reports she is mindful when giving liquids and solids.  -continue carbidopa-levodopa (Sinemet)  mg tablet; 2 tabs in am; 2 tabs in afternoon; 1 tab at pm Orally Three times a day  -education provided Aspiration Precautions  -encouraged spouse to thicken liquids to honey consistency, crush medications and administer with applesauce/pudding/yogurt.    Coronary arteriosclerosis  Carotid artery disease, unspecified laterality, unspecified type (CMS-[facility])  Hyperlipidemia, unspecified hyperlipidemia type  Chronic, stable.   -Lipid panel; Future  Type 2 diabetes mellitus without complication, without long-term current use of insulin (Multi)  History of. Does not require antidiabetic medications or insulin at this time. Not checking BG anymore, as all results have been wnl. A1C acceptable.  -continue to monitor via CMP draw biannual and as needed  Anxiety  Major depressive disorder, remission status unspecified, unspecified whether recurrent  Mood is stable. Follows with JOSH Garcia with [facility]  -continue sertraline 150 mg and prn hydroxyzine 25 mg   Primary osteoarthritis involving multiple joints  Muscle spasm  Chronic, stable.  -continue prn tinazidine for spasms   -continue strengthening exercises with encouragement from home health aids.       Patient remains stable. No coughing or choking reported by caregiver. Not using thickener for liquids, encouraged to do so to help prevent silent aspiration during oral intake. Encouraged to  crush medications and administer in applesauce or pudding.  Will continue scheduled House Call NP visits due to non-ambulatory status and advancing dementia.          Sara Elizabeth, APRN-CNP

## 2024-09-09 DIAGNOSIS — M62.838 MUSCLE SPASM: ICD-10-CM

## 2024-09-09 RX ORDER — TIZANIDINE 2 MG/1
2 TABLET ORAL 3 TIMES DAILY PRN
Qty: 120 TABLET | Refills: 0 | Status: SHIPPED | OUTPATIENT
Start: 2024-09-09

## 2024-10-07 ENCOUNTER — TELEPHONE (OUTPATIENT)
Dept: PRIMARY CARE | Facility: CLINIC | Age: 89
End: 2024-10-07
Payer: COMMERCIAL

## 2024-10-07 NOTE — TELEPHONE ENCOUNTER
Pt has open wound to tailbone  notice today. Wound is red and open about 0.5 in or 1 in.  applied bandage.

## 2024-10-07 NOTE — TELEPHONE ENCOUNTER
Noted. I will evaluate her wound when I see her this week. Will you ask him to apply barrier cream (Aquaphor or a Desitin type cream) and try to keep her off her bottom. He can tuck a pillow on her side and shift her from right to left. I am glad to order a waffle pad or similar if insurance will cover it. Thank you!

## 2024-10-08 DIAGNOSIS — L89.152 PRESSURE INJURY OF COCCYGEAL REGION, STAGE 2 (MULTI): Primary | ICD-10-CM

## 2024-10-14 ENCOUNTER — TELEPHONE (OUTPATIENT)
Dept: PRIMARY CARE | Facility: CLINIC | Age: 89
End: 2024-10-14
Payer: COMMERCIAL

## 2024-10-15 ENCOUNTER — APPOINTMENT (OUTPATIENT)
Dept: PRIMARY CARE | Facility: CLINIC | Age: 89
End: 2024-10-15
Payer: COMMERCIAL

## 2024-10-15 ENCOUNTER — OFFICE VISIT (OUTPATIENT)
Dept: PRIMARY CARE | Facility: CLINIC | Age: 89
End: 2024-10-15
Payer: COMMERCIAL

## 2024-10-15 VITALS
HEART RATE: 65 BPM | SYSTOLIC BLOOD PRESSURE: 140 MMHG | OXYGEN SATURATION: 96 % | TEMPERATURE: 98.7 F | DIASTOLIC BLOOD PRESSURE: 78 MMHG | RESPIRATION RATE: 19 BRPM

## 2024-10-15 DIAGNOSIS — G20.A1 PARKINSON'S DISEASE, UNSPECIFIED WHETHER DYSKINESIA PRESENT, UNSPECIFIED WHETHER MANIFESTATIONS FLUCTUATE: Primary | ICD-10-CM

## 2024-10-15 DIAGNOSIS — K59.00 CONSTIPATION, UNSPECIFIED CONSTIPATION TYPE: ICD-10-CM

## 2024-10-15 RX ORDER — SENNOSIDES 8.6 MG/1
8.6 CAPSULE, GELATIN COATED ORAL NIGHTLY
Qty: 30 CAPSULE | Refills: 1 | Status: SHIPPED | OUTPATIENT
Start: 2024-10-15

## 2024-10-15 ASSESSMENT — ENCOUNTER SYMPTOMS
CHOKING: 1
SHORTNESS OF BREATH: 0
PALPITATIONS: 0
CONSTIPATION: 1
SINUS PAIN: 0
SLEEP DISTURBANCE: 1
DIZZINESS: 0
TREMORS: 1
BRUISES/BLEEDS EASILY: 0
VOMITING: 0
AGITATION: 0
APPETITE CHANGE: 0
CHEST TIGHTNESS: 0
NERVOUS/ANXIOUS: 0
DIAPHORESIS: 0
ARTHRALGIAS: 1
UNEXPECTED WEIGHT CHANGE: 0
BLOOD IN STOOL: 0
DIFFICULTY URINATING: 0
LIGHT-HEADEDNESS: 0
HEMATURIA: 0
SORE THROAT: 0
NAUSEA: 0
WHEEZING: 0
CHILLS: 0
SPEECH DIFFICULTY: 1
FLANK PAIN: 0
COUGH: 1
DIARRHEA: 1
NUMBNESS: 0
ACTIVITY CHANGE: 0
TROUBLE SWALLOWING: 1
ABDOMINAL PAIN: 1
HEADACHES: 0
BACK PAIN: 1
WEAKNESS: 1
FATIGUE: 0
WOUND: 0
STRIDOR: 0
FEVER: 0
SEIZURES: 0
DECREASED CONCENTRATION: 0
RHINORRHEA: 0
FREQUENCY: 0
DYSURIA: 0

## 2024-10-15 ASSESSMENT — PAIN SCALES - GENERAL: PAINLEVEL: 0-NO PAIN

## 2024-10-15 NOTE — PROGRESS NOTES
Subjective   Patient ID: Rohini Butler is a 89 y.o. female who presents for Follow-up (Parkinson's, CAD, irregular bowels, pressure injury).    HPI   Today: formed stool       Home Visit: Medically necessary due to dementia/cognitive impairment, unsteady gait/poor balance, and Illness or condition that results in activity limitation or restriction that impacts the ability to leave home such as: equipment and /or human assistance needed to safely leave the home, patient has limited support systems to help the patient attend office visits, the office visit would require excessive physical effort or pain for the patient.       Current Outpatient Medications:     acetaminophen (Tylenol) 500 mg tablet, 1-2 tablets as needed Orally every 8 hrs as needed, Disp: , Rfl:     carbidopa-levodopa (Sinemet)  mg tablet, 2 tabs in am; 2 tabs in afternoon; 1 tab at pm Orally Three times a day, Disp: 450 tablet, Rfl: 1    docusate sodium (Colace) 100 mg capsule, Take 2 capsules (200 mg) by mouth 2 times a day as needed for constipation., Disp: , Rfl:     gabapentin (Neurontin) 100 mg capsule, Take 1 capsule (100 mg) by mouth 3 times a day., Disp: , Rfl:     hydrOXYzine HCL (Atarax) 25 mg tablet, Take 1 tablet (25 mg) by mouth once daily as needed., Disp: , Rfl:     nystatin (Mycostatin) ointment, Apply 1 Application topically 2 times a day., Disp: , Rfl:     OneTouch Ultra Test strip, 1 strip once daily., Disp: , Rfl:     polyethylene glycol (Miralax) 17 gram/dose powder, as directed Orally Once a day, Disp: , Rfl:     sennosides-docusate sodium (Perlita-Colace) 8.6-50 mg tablet, Take 2 tablets by mouth 2 times a day., Disp: , Rfl:     sertraline (Zoloft) 100 mg tablet, Take 1.5 tablets (150 mg) by mouth once daily at bedtime., Disp: 135 tablet, Rfl: 3    tiZANidine (Zanaflex) 2 mg tablet, Take 1 tablet by mouth three times daily as needed for muscle spasm, Disp: 120 tablet, Rfl: 0     Review of Systems   Constitutional:   Negative for activity change, appetite change, chills, diaphoresis, fatigue, fever and unexpected weight change.   HENT:  Positive for trouble swallowing. Negative for congestion, dental problem, mouth sores, rhinorrhea, sinus pain, sneezing and sore throat.    Eyes:  Negative for visual disturbance.   Respiratory:  Positive for cough and choking. Negative for chest tightness, shortness of breath, wheezing and stridor.    Cardiovascular:  Negative for chest pain, palpitations and leg swelling.   Gastrointestinal:  Positive for abdominal pain, constipation and diarrhea. Negative for blood in stool, nausea and vomiting.        Abdominal pain that resolved with BM. For the last 3-4 months, intermittent diarrhea/constipation.   Last week: T no bm, W (suppository w/bm), Th very small, stool softener) F constant smearing, suppository, drank fluid, small bm. Back and forth from bed to toilet. BM over the weekend but very loose. Today, large loose BM.      Genitourinary:  Negative for difficulty urinating, dysuria, flank pain, frequency and hematuria.   Musculoskeletal:  Positive for arthralgias, back pain and gait problem.   Skin:  Negative for wound.        Jhonatan states the area has healed with the use of barrier cream.   Neurological:  Positive for tremors, speech difficulty and weakness. Negative for dizziness, seizures, syncope, light-headedness, numbness and headaches.   Hematological:  Does not bruise/bleed easily.   Psychiatric/Behavioral:  Positive for sleep disturbance. Negative for agitation, behavioral problems and decreased concentration. The patient is not nervous/anxious.        Objective   /78   Pulse 65   Temp 37.1 °C (98.7 °F)   Resp 19   LMP  (LMP Unknown)   SpO2 96% Comment: room air  Lab Results   Component Value Date    HGBA1C 5.8 (H) 06/21/2024      Lab Results   Component Value Date    WBC 8.9 06/21/2024    HGB 12.7 06/21/2024    HCT 41.1 06/21/2024    MCV 97 06/21/2024      06/21/2024      Lab Results   Component Value Date    GLUCOSE 81 06/21/2024    CALCIUM 9.4 06/21/2024     (L) 06/21/2024    K 4.4 06/21/2024    CO2 27 06/21/2024    CL 98 06/21/2024    BUN 20 06/21/2024    CREATININE 0.67 06/21/2024      Lab Results   Component Value Date    INR 1.1 03/20/2022    INR 1.0 01/23/2021    INR 1.0 01/13/2021    PROTIME 13.2 03/20/2022    PROTIME 11.7 01/23/2021    PROTIME 11.5 01/13/2021          Physical Exam  Constitutional:       General: She is not in acute distress.     Appearance: She is normal weight. She is not toxic-appearing.   HENT:      Head: Normocephalic.      Nose: No congestion or rhinorrhea.      Mouth/Throat:      Mouth: Mucous membranes are moist.   Eyes:      General: No scleral icterus.     Pupils: Pupils are equal, round, and reactive to light.   Cardiovascular:      Rate and Rhythm: Normal rate and regular rhythm.      Pulses: Normal pulses.      Heart sounds: Murmur heard.   Pulmonary:      Effort: Pulmonary effort is normal. No respiratory distress.      Breath sounds: Normal breath sounds. No wheezing or rales.   Abdominal:      General: There is no distension.      Palpations: Abdomen is soft.      Tenderness: There is no abdominal tenderness. There is no guarding.      Comments: Hypoactive BS   Skin:     General: Skin is warm and dry.      Capillary Refill: Capillary refill takes less than 2 seconds.      Findings: Lesion present. No rash.      Comments: Right temporal lesion, scaly   Neurological:      Mental Status: She is alert. Mental status is at baseline.   Psychiatric:         Mood and Affect: Mood normal.         Behavior: Behavior normal.         Assessment/Plan                Sara Elizabeth, APRN-CNP

## 2024-10-18 PROBLEM — K59.00 CONSTIPATION: Status: ACTIVE | Noted: 2024-10-18

## 2024-10-27 DIAGNOSIS — G20.A1 PARKINSON'S DISEASE, UNSPECIFIED WHETHER DYSKINESIA PRESENT, UNSPECIFIED WHETHER MANIFESTATIONS FLUCTUATE: ICD-10-CM

## 2024-10-28 RX ORDER — CARBIDOPA AND LEVODOPA 25; 100 MG/1; MG/1
TABLET ORAL
Qty: 450 TABLET | Refills: 3 | Status: SHIPPED | OUTPATIENT
Start: 2024-10-28

## 2024-10-29 ENCOUNTER — TELEPHONE (OUTPATIENT)
Dept: PRIMARY CARE | Facility: CLINIC | Age: 89
End: 2024-10-29
Payer: COMMERCIAL

## 2024-10-29 DIAGNOSIS — K59.00 CONSTIPATION, UNSPECIFIED CONSTIPATION TYPE: ICD-10-CM

## 2024-10-29 RX ORDER — SENNOSIDES 8.6 MG/1
8.6 CAPSULE, GELATIN COATED ORAL 2 TIMES DAILY
Qty: 30 CAPSULE | Refills: 1 | Status: SHIPPED | OUTPATIENT
Start: 2024-10-29

## 2024-11-08 ENCOUNTER — TELEPHONE (OUTPATIENT)
Dept: PRIMARY CARE | Facility: CLINIC | Age: 89
End: 2024-11-08
Payer: COMMERCIAL

## 2024-11-11 NOTE — TELEPHONE ENCOUNTER
called this morning states pt has not had a bowel movement in a week. He states he attempted to give suppository.

## 2024-11-15 ENCOUNTER — TELEPHONE (OUTPATIENT)
Dept: PRIMARY CARE | Facility: CLINIC | Age: 89
End: 2024-11-15
Payer: COMMERCIAL

## 2024-12-19 ENCOUNTER — TELEPHONE (OUTPATIENT)
Dept: PRIMARY CARE | Facility: CLINIC | Age: 89
End: 2024-12-19
Payer: COMMERCIAL

## 2024-12-19 NOTE — TELEPHONE ENCOUNTER
Myranda, caregiver for patient returning call with information as follows: OK to see patient tomorrow, she will let  know appointment confirmed.  This nurse speaking with Myranda who confirms address and insurance.  COVID screening negative, verbal consent to send provider as patient  who is her employer asked her to return call and confirm message.  Appointment confirmed.

## 2024-12-20 ENCOUNTER — OFFICE VISIT (OUTPATIENT)
Dept: PRIMARY CARE | Facility: CLINIC | Age: 89
End: 2024-12-20
Payer: COMMERCIAL

## 2024-12-20 ENCOUNTER — LAB (OUTPATIENT)
Dept: LAB | Facility: LAB | Age: 89
End: 2024-12-20
Payer: COMMERCIAL

## 2024-12-20 VITALS
TEMPERATURE: 99.6 F | DIASTOLIC BLOOD PRESSURE: 85 MMHG | HEART RATE: 66 BPM | OXYGEN SATURATION: 99 % | RESPIRATION RATE: 17 BRPM | SYSTOLIC BLOOD PRESSURE: 139 MMHG

## 2024-12-20 DIAGNOSIS — K21.9 GASTRO-ESOPHAGEAL REFLUX DISEASE WITHOUT ESOPHAGITIS: ICD-10-CM

## 2024-12-20 DIAGNOSIS — G20.A1 PARKINSON'S DISEASE, UNSPECIFIED WHETHER DYSKINESIA PRESENT, UNSPECIFIED WHETHER MANIFESTATIONS FLUCTUATE: ICD-10-CM

## 2024-12-20 DIAGNOSIS — E78.5 HYPERLIPIDEMIA, UNSPECIFIED HYPERLIPIDEMIA TYPE: ICD-10-CM

## 2024-12-20 DIAGNOSIS — M62.838 MUSCLE SPASM: ICD-10-CM

## 2024-12-20 DIAGNOSIS — F32.9 MAJOR DEPRESSIVE DISORDER, REMISSION STATUS UNSPECIFIED, UNSPECIFIED WHETHER RECURRENT: ICD-10-CM

## 2024-12-20 DIAGNOSIS — M41.9 SCOLIOSIS OF LUMBAR SPINE, UNSPECIFIED SCOLIOSIS TYPE: ICD-10-CM

## 2024-12-20 DIAGNOSIS — M81.0 AGE-RELATED OSTEOPOROSIS WITHOUT CURRENT PATHOLOGICAL FRACTURE: ICD-10-CM

## 2024-12-20 DIAGNOSIS — I77.9 CAROTID ARTERY DISEASE, UNSPECIFIED LATERALITY, UNSPECIFIED TYPE (CMS-HCC): Primary | ICD-10-CM

## 2024-12-20 DIAGNOSIS — N39.41 URGE INCONTINENCE OF URINE: ICD-10-CM

## 2024-12-20 DIAGNOSIS — F41.1 GENERALIZED ANXIETY DISORDER: ICD-10-CM

## 2024-12-20 DIAGNOSIS — K59.01 SLOW TRANSIT CONSTIPATION: ICD-10-CM

## 2024-12-20 DIAGNOSIS — E11.9 TYPE 2 DIABETES MELLITUS WITHOUT COMPLICATION, WITHOUT LONG-TERM CURRENT USE OF INSULIN (MULTI): ICD-10-CM

## 2024-12-20 DIAGNOSIS — M15.0 PRIMARY OSTEOARTHRITIS INVOLVING MULTIPLE JOINTS: ICD-10-CM

## 2024-12-20 DIAGNOSIS — R13.10 DYSPHAGIA, UNSPECIFIED TYPE: ICD-10-CM

## 2024-12-20 PROBLEM — R32 INCONTINENCE: Status: RESOLVED | Noted: 2023-11-20 | Resolved: 2024-12-20

## 2024-12-20 PROBLEM — F41.9 ANXIETY: Status: RESOLVED | Noted: 2023-11-20 | Resolved: 2024-12-20

## 2024-12-20 LAB
ALBUMIN SERPL BCP-MCNC: 3.5 G/DL (ref 3.4–5)
ALP SERPL-CCNC: 67 U/L (ref 33–136)
ALT SERPL W P-5'-P-CCNC: 5 U/L (ref 7–45)
ANION GAP SERPL CALC-SCNC: 14 MMOL/L (ref 10–20)
AST SERPL W P-5'-P-CCNC: 12 U/L (ref 9–39)
BASOPHILS # BLD AUTO: 0.04 X10*3/UL (ref 0–0.1)
BASOPHILS NFR BLD AUTO: 0.5 %
BILIRUB SERPL-MCNC: 0.3 MG/DL (ref 0–1.2)
BUN SERPL-MCNC: 18 MG/DL (ref 6–23)
CALCIUM SERPL-MCNC: 8.9 MG/DL (ref 8.6–10.3)
CHLORIDE SERPL-SCNC: 98 MMOL/L (ref 98–107)
CHOLEST SERPL-MCNC: 230 MG/DL (ref 0–199)
CHOLESTEROL/HDL RATIO: 4.1
CO2 SERPL-SCNC: 26 MMOL/L (ref 21–32)
CREAT SERPL-MCNC: 0.59 MG/DL (ref 0.5–1.05)
EGFRCR SERPLBLD CKD-EPI 2021: 87 ML/MIN/1.73M*2
EOSINOPHIL # BLD AUTO: 0.1 X10*3/UL (ref 0–0.4)
EOSINOPHIL NFR BLD AUTO: 1.3 %
ERYTHROCYTE [DISTWIDTH] IN BLOOD BY AUTOMATED COUNT: 13.5 % (ref 11.5–14.5)
GLUCOSE SERPL-MCNC: 119 MG/DL (ref 74–99)
HCT VFR BLD AUTO: 38.3 % (ref 36–46)
HDLC SERPL-MCNC: 56.1 MG/DL
HGB BLD-MCNC: 12.1 G/DL (ref 12–16)
IMM GRANULOCYTES # BLD AUTO: 0.02 X10*3/UL (ref 0–0.5)
IMM GRANULOCYTES NFR BLD AUTO: 0.3 % (ref 0–0.9)
LDLC SERPL CALC-MCNC: 151 MG/DL
LYMPHOCYTES # BLD AUTO: 1.34 X10*3/UL (ref 0.8–3)
LYMPHOCYTES NFR BLD AUTO: 16.8 %
MCH RBC QN AUTO: 30.5 PG (ref 26–34)
MCHC RBC AUTO-ENTMCNC: 31.6 G/DL (ref 32–36)
MCV RBC AUTO: 97 FL (ref 80–100)
MONOCYTES # BLD AUTO: 0.67 X10*3/UL (ref 0.05–0.8)
MONOCYTES NFR BLD AUTO: 8.4 %
NEUTROPHILS # BLD AUTO: 5.82 X10*3/UL (ref 1.6–5.5)
NEUTROPHILS NFR BLD AUTO: 72.7 %
NON HDL CHOLESTEROL: 174 MG/DL (ref 0–149)
NRBC BLD-RTO: 0 /100 WBCS (ref 0–0)
PLATELET # BLD AUTO: 322 X10*3/UL (ref 150–450)
POTASSIUM SERPL-SCNC: 4.1 MMOL/L (ref 3.5–5.3)
PROT SERPL-MCNC: 6.2 G/DL (ref 6.4–8.2)
RBC # BLD AUTO: 3.97 X10*6/UL (ref 4–5.2)
SODIUM SERPL-SCNC: 134 MMOL/L (ref 136–145)
TRIGL SERPL-MCNC: 113 MG/DL (ref 0–149)
TSH SERPL-ACNC: 1.1 MIU/L (ref 0.44–3.98)
VLDL: 23 MG/DL (ref 0–40)
WBC # BLD AUTO: 8 X10*3/UL (ref 4.4–11.3)

## 2024-12-20 PROCEDURE — 85025 COMPLETE CBC W/AUTO DIFF WBC: CPT

## 2024-12-20 PROCEDURE — 36415 COLL VENOUS BLD VENIPUNCTURE: CPT

## 2024-12-20 PROCEDURE — 1157F ADVNC CARE PLAN IN RCRD: CPT

## 2024-12-20 PROCEDURE — 99349 HOME/RES VST EST MOD MDM 40: CPT

## 2024-12-20 PROCEDURE — 80061 LIPID PANEL: CPT

## 2024-12-20 PROCEDURE — 80053 COMPREHEN METABOLIC PANEL: CPT

## 2024-12-20 PROCEDURE — 1160F RVW MEDS BY RX/DR IN RCRD: CPT

## 2024-12-20 PROCEDURE — 84443 ASSAY THYROID STIM HORMONE: CPT

## 2024-12-20 PROCEDURE — 82607 VITAMIN B-12: CPT

## 2024-12-20 PROCEDURE — 83036 HEMOGLOBIN GLYCOSYLATED A1C: CPT

## 2024-12-20 PROCEDURE — 82306 VITAMIN D 25 HYDROXY: CPT

## 2024-12-20 PROCEDURE — 1159F MED LIST DOCD IN RCRD: CPT

## 2024-12-20 NOTE — PROGRESS NOTES
Subjective   Patient ID: Rohini Butler is a 89 y.o. female who presents for Follow-up (Routine 2 month follow up w/labs).    HPI   Patient is seen in her private home today. She is alert and oriented to self only. She remains homebound due to mobility issues and advancing dementia.      PMH: CAD, HLD, Parkinson's, GERD, anxiety, depression, OA     Patient seen today up in recliner in her second story bedroom. Her caregiver is present for assessment. Patient is a very poor historian secondary to Parkinson's/ dementia; HPI and ROS were supplemented by caregiver and patient's chart. Caregiver reports she has been cooperative with her exercises and engaged during the day. She states that  She continues to follow with Michelle Chavez CNP with [facility] for mental health services.  Patient remains unable to walk but she continues to do stregthen exercises with spouse and caregivers. No recent falls reported. She continues to have private duty care givers 5-6 days a week for 5 hours at a time. The couple's 5 sons continue take turn throughout the week coming over to give addtional support to patient. Overall appetite and hydration status stable per caregiver. The patient continues with intermittent constipation, miralax has been helpful. Spouse continues to manage all of patient's medications and medical care.      Parkinson's: Does not follow with a neurologist. No changes in carbidopa-levodopa, spouse states that he does give her all doses, but the timing is not always consistent from day to day.     Bilateral Knee Pain/ OA/ Muscle Spasms - Muscle relaxant given daily with some success. Caregiver states that stretching patient's legs and performing exercises that PT/OT prescribed for patient help to keep her BLE contractions from getting worse.      Anxiety/ Depression - Patient continues to follow with Mental Health Services. Caregiver reports that sometimes the patient will cry intermittently during the day, but  is cooperative. No agitation or safety concerns.      Home Visit: Medically necessary due to dementia/cognitive impairment, unsteady gait/poor balance, and Illness or condition that results in activity limitation or restriction that impacts the ability to leave home such as: equipment and /or human assistance needed to safely leave the home, patient has limited support systems to help the patient attend office visits, the office visit would require excessive physical effort or pain for the patient.       Current Outpatient Medications:     acetaminophen (Tylenol) 500 mg tablet, 1-2 tablets as needed Orally every 8 hrs as needed, Disp: , Rfl:     carbidopa-levodopa (Sinemet)  mg tablet, TAKE 2 TABLETS BY MOUTH IN THE MORNING AND IN THE AFTERNOON AND TAKE  1 TABLET IN THE EVENING, Disp: 450 tablet, Rfl: 3    docusate sodium (Colace) 100 mg capsule, Take 2 capsules (200 mg) by mouth 2 times a day as needed for constipation., Disp: , Rfl:     gabapentin (Neurontin) 100 mg capsule, Take 1 capsule (100 mg) by mouth 3 times a day., Disp: , Rfl:     hydrOXYzine HCL (Atarax) 25 mg tablet, Take 1 tablet (25 mg) by mouth once daily as needed., Disp: , Rfl:     nystatin (Mycostatin) ointment, Apply 1 Application topically 2 times a day., Disp: , Rfl:     OneTouch Ultra Test strip, 1 strip once daily., Disp: , Rfl:     polyethylene glycol (Miralax) 17 gram/dose powder, as directed Orally Once a day, Disp: , Rfl:     sennosides (senna) 8.6 mg capsule, Take 1 capsule (8.6 mg) by mouth 2 times a day., Disp: 30 capsule, Rfl: 1    sertraline (Zoloft) 100 mg tablet, Take 1.5 tablets (150 mg) by mouth once daily at bedtime., Disp: 135 tablet, Rfl: 3    tiZANidine (Zanaflex) 2 mg tablet, Take 1 tablet by mouth three times daily as needed for muscle spasm, Disp: 120 tablet, Rfl: 0     Review of Systems  Constitutional:  Negative for activity change, appetite change, chills, diaphoresis, fatigue, fever and unexpected weight change.    HENT:  Positive for trouble swallowing. Negative for congestion, dental problem, mouth sores, rhinorrhea, sinus pain, sneezing and sore throat.    Eyes:  Negative for visual disturbance.   Respiratory:  Positive for cough and choking. Negative for chest tightness, shortness of breath, wheezing and stridor.    Cardiovascular:  Negative for chest pain, palpitations and leg swelling.   Gastrointestinal:  Positive for abdominal pain, constipation and diarrhea. Negative for blood in stool, nausea and vomiting.   Genitourinary:  Negative for difficulty urinating, dysuria, flank pain, frequency and hematuria.   Musculoskeletal:  Positive for arthralgias, back pain and gait problem.   Skin:  Negative for wound.     Caregivers continue to use barrier cream for preventative measure.  Neurological:  Positive for tremors, speech difficulty and weakness. Negative for dizziness, seizures, syncope, light-headedness, numbness and headaches.   Hematological:  Does not bruise/bleed easily.   Psychiatric/Behavioral:  Positive for sleep disturbance. Negative for agitation, behavioral problems and decreased concentration. The patient is not nervous/anxious.       Objective   /85   Pulse 66   Temp 37.6 °C (99.6 °F)   Resp 17   LMP  (LMP Unknown)   SpO2 99% Comment: room air  Lab Results   Component Value Date    HGBA1C 5.8 (H) 12/20/2024      Lab Results   Component Value Date    WBC 8.0 12/20/2024    HGB 12.1 12/20/2024    HCT 38.3 12/20/2024    MCV 97 12/20/2024     12/20/2024      Lab Results   Component Value Date    GLUCOSE 119 (H) 12/20/2024    CALCIUM 8.9 12/20/2024     (L) 12/20/2024    K 4.1 12/20/2024    CO2 26 12/20/2024    CL 98 12/20/2024    BUN 18 12/20/2024    CREATININE 0.59 12/20/2024      Lab Results   Component Value Date    INR 1.1 03/20/2022    INR 1.0 01/23/2021    INR 1.0 01/13/2021    PROTIME 13.2 03/20/2022    PROTIME 11.7 01/23/2021    PROTIME 11.5 01/13/2021      Physical  Exam  Constitutional:       General: She is not in acute distress.     Appearance: She is normal weight. She is not toxic-appearing.   HENT:      Head: Normocephalic.      Nose: No congestion or rhinorrhea.      Mouth/Throat:      Mouth: Mucous membranes are moist.   Eyes:      General: No scleral icterus.     Pupils: Pupils are equal, round, and reactive to light.   Cardiovascular:      Rate and Rhythm: Normal rate and regular rhythm.      Pulses: Normal pulses.      Heart sounds: Murmur heard.   Pulmonary:      Effort: Pulmonary effort is normal. No respiratory distress.      Breath sounds: Normal breath sounds. No wheezing or rales.   Abdominal:      General: There is no distension.      Palpations: Abdomen is soft.      Tenderness: There is no abdominal tenderness. There is no guarding.      Comments: Hypoactive BS   Skin:     General: Skin is warm and dry.      Capillary Refill: Capillary refill takes less than 2 seconds.      Findings: Lesion present. No rash.      Comments: Right temporal lesion, scaly (unchanged)  Neurological:      Mental Status: She is alert. Mental status is at baseline.   Psychiatric:         Mood and Affect: Mood normal.         Behavior: Behavior normal.   Assessment/Plan   Cardiac and Vasculature  HTN/HLD - Patient currently off of any cardiac medications. Will continue to monitor VS as needed. Spouse to notify provider for any new cardiac concerns     Endocrine/Metabolic  DM2 - Patient off of any diabetic medications. Will monitor A1C routinely. Spouse to notify provider for any symptoms associated with hypo or hyper glycemia     Mental Health  Depression/Anxiety - Mood is stable.   -continue prn hydroxyzine 25 mg and sertraline 100 mg  -maintain routine follow up with [facility] for continued evaluation and treatments     Musculoskeletal and Injuries  Muscle spasms/OA - pain is controlled. Caregivers encourage her to use her stationary pedal bike and perform passive  ROM exercises with her.   -continue physical activity as tolerated  -continue prn acetaminophen, gabapentin 100 mg TID, and prn tizanidine 2 mg TID    Neuro  Parkinson's - stable. Does not follow with neurologist.   -continue carbidopa - levodopa  mg as prescribed  -continue safety interventions    Gastrointestinal and Abdominal  Constipation - Has had trouble with intermittent constipation and diarrhea.   -continue prn miralax, daily colace.   -encourage adequate hydration with water and increase in dietary fiber    Patient is stable. Routine labs obtained this visit, patient tolerated with minimal discomfort. Will continue with scheduled and prn House Call NP visits as patient has significant immobility and does not leave the home.              Sara Elizabeth, APRN-CNP

## 2024-12-21 LAB
25(OH)D3 SERPL-MCNC: 24 NG/ML (ref 30–100)
EST. AVERAGE GLUCOSE BLD GHB EST-MCNC: 120 MG/DL
HBA1C MFR BLD: 5.8 %
VIT B12 SERPL-MCNC: 413 PG/ML (ref 211–911)

## 2024-12-27 ENCOUNTER — TELEPHONE (OUTPATIENT)
Dept: PRIMARY CARE | Facility: CLINIC | Age: 89
End: 2024-12-27
Payer: COMMERCIAL

## 2024-12-27 DIAGNOSIS — H10.33 ACUTE BACTERIAL CONJUNCTIVITIS OF BOTH EYES: Primary | ICD-10-CM

## 2024-12-27 RX ORDER — POLYMYXIN B SULFATE AND TRIMETHOPRIM 1; 10000 MG/ML; [USP'U]/ML
1 SOLUTION OPHTHALMIC EVERY 6 HOURS
Qty: 10 ML | Refills: 0 | Status: SHIPPED | OUTPATIENT
Start: 2024-12-27 | End: 2025-01-03

## 2024-12-27 NOTE — ASSESSMENT & PLAN NOTE
Parkinson's - stable. Does not follow with neurologist.   -continue carbidopa - levodopa  mg as prescribed  -continue safety interventions

## 2024-12-27 NOTE — ASSESSMENT & PLAN NOTE
DM2 - Patient off of any diabetic medications. Will monitor A1C routinely. Spouse to notify provider for any symptoms associated with hypo or hyper glycemia

## 2024-12-27 NOTE — TELEPHONE ENCOUNTER
Turner called to inquire regarding the eye ointment be filled and sent to pharmacy as was discussed at the last appointment.  Please send polymyxin B sulfate to [facility] in [place].    Thank you in advance.

## 2024-12-27 NOTE — ASSESSMENT & PLAN NOTE
Muscle spasms/OA - pain is controlled. Caregivers encourage her to use her stationary pedal bike and perform passive ROM exercises with her.   -continue physical activity as tolerated  -continue prn acetaminophen, gabapentin 100 mg TID, and prn tizanidine 2 mg TID

## 2024-12-27 NOTE — ASSESSMENT & PLAN NOTE
Depression/Anxiety - Mood is stable.   -continue prn hydroxyzine 25 mg and sertraline 100 mg  -maintain routine follow up with [facility] for continued evaluation and treatments

## 2024-12-27 NOTE — ASSESSMENT & PLAN NOTE
Constipation - Has had trouble with intermittent constipation and diarrhea.   -continue prn miralax, daily colace.   -encourage adequate hydration with water and increase in dietary fiber

## 2024-12-27 NOTE — ASSESSMENT & PLAN NOTE
HTN/HLD - Patient currently off of any cardiac medications. Will continue to monitor VS as needed. Spouse to notify provider for any new cardiac concerns

## 2024-12-30 DIAGNOSIS — M62.838 MUSCLE SPASM: ICD-10-CM

## 2024-12-30 RX ORDER — TIZANIDINE 2 MG/1
2 TABLET ORAL 3 TIMES DAILY PRN
Qty: 120 TABLET | Refills: 0 | Status: SHIPPED | OUTPATIENT
Start: 2024-12-30

## 2025-02-11 ENCOUNTER — TELEPHONE (OUTPATIENT)
Dept: PRIMARY CARE | Facility: CLINIC | Age: 89
End: 2025-02-11
Payer: MEDICARE

## 2025-02-11 NOTE — TELEPHONE ENCOUNTER
Patient  calls to report there was no follow up appointment scheduled at last visit.  Please let me know time frame for follow up so I can get them scheduled.

## 2025-02-12 NOTE — TELEPHONE ENCOUNTER
Call placed to patient number as listed, no answer, left message appointment held 3/12/25 12:30 pm as provider in patients area this date.

## 2025-03-11 ENCOUNTER — TELEPHONE (OUTPATIENT)
Dept: PRIMARY CARE | Facility: CLINIC | Age: 89
End: 2025-03-11
Payer: MEDICARE

## 2025-03-12 ENCOUNTER — OFFICE VISIT (OUTPATIENT)
Dept: PRIMARY CARE | Facility: CLINIC | Age: 89
End: 2025-03-12
Payer: MEDICARE

## 2025-03-12 VITALS
SYSTOLIC BLOOD PRESSURE: 148 MMHG | OXYGEN SATURATION: 94 % | RESPIRATION RATE: 16 BRPM | TEMPERATURE: 97.5 F | DIASTOLIC BLOOD PRESSURE: 79 MMHG | HEART RATE: 77 BPM

## 2025-03-12 DIAGNOSIS — I77.9 CAROTID ARTERY DISEASE, UNSPECIFIED LATERALITY, UNSPECIFIED TYPE (CMS-HCC): ICD-10-CM

## 2025-03-12 DIAGNOSIS — M15.0 PRIMARY OSTEOARTHRITIS INVOLVING MULTIPLE JOINTS: ICD-10-CM

## 2025-03-12 DIAGNOSIS — E11.9 TYPE 2 DIABETES MELLITUS WITHOUT COMPLICATION, WITHOUT LONG-TERM CURRENT USE OF INSULIN (MULTI): ICD-10-CM

## 2025-03-12 DIAGNOSIS — F32.9 MAJOR DEPRESSIVE DISORDER, REMISSION STATUS UNSPECIFIED, UNSPECIFIED WHETHER RECURRENT: ICD-10-CM

## 2025-03-12 DIAGNOSIS — R13.10 DYSPHAGIA, UNSPECIFIED TYPE: ICD-10-CM

## 2025-03-12 DIAGNOSIS — I25.10 CORONARY ARTERIOSCLEROSIS: ICD-10-CM

## 2025-03-12 DIAGNOSIS — M62.838 MUSCLE SPASM: ICD-10-CM

## 2025-03-12 DIAGNOSIS — E78.5 HYPERLIPIDEMIA, UNSPECIFIED HYPERLIPIDEMIA TYPE: ICD-10-CM

## 2025-03-12 DIAGNOSIS — R40.0 SLEEPINESS: ICD-10-CM

## 2025-03-12 DIAGNOSIS — G20.A1 PARKINSON'S DISEASE, UNSPECIFIED WHETHER DYSKINESIA PRESENT, UNSPECIFIED WHETHER MANIFESTATIONS FLUCTUATE: Primary | ICD-10-CM

## 2025-03-12 DIAGNOSIS — F41.1 GENERALIZED ANXIETY DISORDER: ICD-10-CM

## 2025-03-12 DIAGNOSIS — K59.01 SLOW TRANSIT CONSTIPATION: ICD-10-CM

## 2025-03-12 PROCEDURE — 1160F RVW MEDS BY RX/DR IN RCRD: CPT

## 2025-03-12 PROCEDURE — 1159F MED LIST DOCD IN RCRD: CPT

## 2025-03-12 PROCEDURE — 99349 HOME/RES VST EST MOD MDM 40: CPT

## 2025-03-12 PROCEDURE — 1157F ADVNC CARE PLAN IN RCRD: CPT

## 2025-03-12 RX ORDER — GABAPENTIN 100 MG/1
100 CAPSULE ORAL 2 TIMES DAILY
Qty: 180 CAPSULE | Refills: 3 | Status: SHIPPED | OUTPATIENT
Start: 2025-03-12 | End: 2026-03-12

## 2025-03-12 NOTE — PROGRESS NOTES
Subjective   Patient ID: Rohini Butler is a 89 y.o. female who presents for Follow-up (Chronic medical conditions).    HPI   Patient is seen in her private home today. She is alert and oriented to self only. She remains homebound due to mobility issues and advancing dementia.      PMH: CAD, HLD, Parkinson's, GERD, anxiety, depression, OA     Patient seen today up in recliner in her second story bedroom. Her caregiver is present for assessment. Patient is a very poor historian secondary to Parkinson's/ dementia; HPI and ROS were supplemented by caregiver and patient's chart. Caregiver reports she has been cooperative with her exercises, but tends to sleep more often during the day, which her  also expresses concern for. She continues to follow with Michelle Chavez CNP with [facility] for mental health services.  Patient remains unable to walk but she continues to do stregthen exercises with spouse and caregivers. No recent falls reported. She continues to have private duty care givers 5-6 days a week for 5 hours at a time. The couple's 5 sons continue take turn throughout the week coming over to give addtional support to patient. Overall appetite and hydration status stable per caregiver. The patient continues with intermittent constipation, miralax has been helpful. Spouse continues to manage all of patient's medications and medical care. He reports that she is having more difficulty swallowing her pills, she will at times pocket them or spit them out.       Parkinson's: Does not follow with a neurologist. No changes in carbidopa-levodopa, spouse states that he does give her all doses, but the timing is not always consistent from day to day. He reports that the patient seems to sleep most of the day, except when it is time for meals.     Bilateral Knee Pain/ OA/ Muscle Spasms - Muscle relaxant given daily with some success. Caregiver states that stretching patient's legs and performing exercises that  PT/OT prescribed for patient help to keep her BLE contractions from getting worse.      Anxiety/ Depression - Patient continues to follow with Mental Health Services. Caregiver and  do not report any concerning behaviors or sadness, but do report that she is more sleepy for the last several weeks.    Home Visit: Medically necessary due to dementia/cognitive impairment, unsteady gait/poor balance, and Illness or condition that results in activity limitation or restriction that impacts the ability to leave home such as: equipment and /or human assistance needed to safely leave the home, patient has limited support systems to help the patient attend office visits, the office visit would require excessive physical effort or pain for the patient.       Current Outpatient Medications:     acetaminophen (Tylenol) 500 mg tablet, 1-2 tablets as needed Orally every 8 hrs as needed, Disp: , Rfl:     carbidopa-levodopa (Sinemet)  mg tablet, TAKE 2 TABLETS BY MOUTH IN THE MORNING AND IN THE AFTERNOON AND TAKE  1 TABLET IN THE EVENING, Disp: 450 tablet, Rfl: 3    docusate sodium (Colace) 100 mg capsule, Take 2 capsules (200 mg) by mouth 2 times a day as needed for constipation., Disp: , Rfl:     gabapentin (Neurontin) 100 mg capsule, Take 1 capsule (100 mg) by mouth 2 times a day., Disp: 180 capsule, Rfl: 3    hydrOXYzine HCL (Atarax) 25 mg tablet, Take 1 tablet (25 mg) by mouth once daily as needed., Disp: , Rfl:     nystatin (Mycostatin) ointment, Apply 1 Application topically 2 times a day., Disp: , Rfl:     OneTouch Ultra Test strip, 1 strip once daily., Disp: , Rfl:     polyethylene glycol (Miralax) 17 gram/dose powder, as directed Orally Once a day, Disp: , Rfl:     sennosides (senna) 8.6 mg capsule, Take 1 capsule (8.6 mg) by mouth 2 times a day., Disp: 30 capsule, Rfl: 1    sertraline (Zoloft) 100 mg tablet, Take 1.5 tablets (150 mg) by mouth once daily at bedtime., Disp: 135 tablet, Rfl: 3    tiZANidine  (Zanaflex) 2 mg tablet, Take 1 tablet (2 mg) by mouth 3 times a day as needed for muscle spasms., Disp: 120 tablet, Rfl: 0     Review of Systems   Constitutional:  Positive for fatigue. Negative for appetite change, chills, diaphoresis, fever and unexpected weight change.   HENT:  Positive for rhinorrhea and trouble swallowing. Negative for congestion, sinus pressure, sinus pain, sneezing, sore throat and tinnitus.    Eyes:  Negative for visual disturbance.   Respiratory:  Negative for cough, chest tightness, shortness of breath and wheezing.    Cardiovascular:  Negative for chest pain, palpitations and leg swelling.   Gastrointestinal:  Positive for constipation. Negative for abdominal distention, abdominal pain, anal bleeding, blood in stool, diarrhea, nausea, rectal pain and vomiting.   Genitourinary:  Negative for dysuria, flank pain, frequency and hematuria.   Musculoskeletal:  Positive for gait problem. Negative for arthralgias and back pain.   Skin:  Negative for rash and wound.   Neurological:  Positive for tremors, speech difficulty and weakness. Negative for dizziness, seizures, syncope, facial asymmetry, light-headedness and headaches.        Generalized weakness, symptoms reported are r/t progressing Parkinson's    Hematological:  Does not bruise/bleed easily.   Psychiatric/Behavioral:  Positive for confusion and sleep disturbance. Negative for agitation, behavioral problems, decreased concentration, dysphoric mood and hallucinations. The patient is not nervous/anxious.        Objective   /79   Pulse 77   Temp 36.4 °C (97.5 °F)   Resp 16   LMP  (LMP Unknown)   SpO2 94% Comment: room air  Lab Results   Component Value Date    HGBA1C 5.8 (H) 12/20/2024      Lab Results   Component Value Date    WBC 8.0 12/20/2024    HGB 12.1 12/20/2024    HCT 38.3 12/20/2024    MCV 97 12/20/2024     12/20/2024      Lab Results   Component Value Date    GLUCOSE 119 (H) 12/20/2024    CALCIUM 8.9 12/20/2024      (L) 12/20/2024    K 4.1 12/20/2024    CO2 26 12/20/2024    CL 98 12/20/2024    BUN 18 12/20/2024    CREATININE 0.59 12/20/2024      Lab Results   Component Value Date    INR 1.1 03/20/2022    INR 1.0 01/23/2021    INR 1.0 01/13/2021    PROTIME 13.2 03/20/2022    PROTIME 11.7 01/23/2021    PROTIME 11.5 01/13/2021      Physical Exam  Constitutional:       General: She is not in acute distress.     Appearance: She is normal weight. She is not toxic-appearing.      Comments: Sleepy, well-groomed, well-nourished. Chronically ill appearing.    HENT:      Head: Normocephalic.      Mouth/Throat:      Mouth: Mucous membranes are moist.      Pharynx: Oropharynx is clear. No oropharyngeal exudate or posterior oropharyngeal erythema.   Eyes:      General: No scleral icterus.     Conjunctiva/sclera: Conjunctivae normal.      Pupils: Pupils are equal, round, and reactive to light.   Neck:      Comments: Neck is stiff, at baseline d/t Parkinson's   Cardiovascular:      Rate and Rhythm: Normal rate and regular rhythm.      Heart sounds: Murmur heard.      Comments: Euvolemic, no JVD.  Pulmonary:      Effort: Pulmonary effort is normal. No respiratory distress.      Breath sounds: Normal breath sounds. No wheezing or rales.   Abdominal:      General: There is no distension.      Palpations: Abdomen is soft.      Tenderness: There is no abdominal tenderness. There is no guarding.      Comments: Hypoactive BS all quadrants   Genitourinary:     Comments: Did not assess area.   Musculoskeletal:      Right lower leg: No edema.      Left lower leg: No edema.      Comments: BLE are contracted, but she does have some passive ROM. BUE with full, active ROM.    Skin:     General: Skin is warm and dry.      Capillary Refill: Capillary refill takes less than 2 seconds.      Coloration: Skin is not jaundiced.      Findings: No bruising.      Comments: Right temporal lesion, scaly (unchanged)   Neurological:      Comments: More sleepy  "this visit, but responds to voice. Follows simple commands. Staccato speech noted, able to answer simple questions. States \"I am tired\".   BUE 5/5 strength  BLE 3/5 strength   Psychiatric:      Comments: Calm and cooperative for assessment.          Assessment/Plan   Cardiac and Vasculature  HTN/HLD - Patient currently off of any cardiac medications. Will continue to monitor VS as needed. Spouse to notify provider for any new cardiac concerns     Endocrine/Metabolic  DM2 - Patient off of any diabetic medications. Will monitor A1C routinely. Spouse to notify provider for any symptoms associated with hypo or hyper glycemia     Gastrointestinal and Abdominal  Dysphagia - Spouse reports that the patient is having more difficulty swallowing her pills. No aspiration episodes, eating well.   -encouraged spouse to crush pills and give with applesauce, pudding, or yogurt  -aspiration precautions: small bites, small sips, no use of straws, head/chin method for swallowing    Constipation - Has had trouble with intermittent constipation and diarrhea.   -continue prn miralax, daily colace.   -encourage adequate hydration with water and increase in dietary fiber    Mental Health  Depression/Anxiety - Mood is stable.   -continue prn hydroxyzine 25 mg and sertraline 100 mg  -maintain routine follow up with [facility] for continued evaluation and treatments     Neuro  Parkinson's - stable. Does not follow with neurologist.   -continue carbidopa - levodopa  mg as prescribed  -continue safety interventions    Musculoskeletal and Injuries  Muscle spasms/OA - pain is controlled. Caregivers encourage her to use her stationary pedal bike and perform passive ROM exercises with her.   -continue physical activity as tolerated  -decreased gabapentin 100 mg to BID from TID,  to give update if daytime sleepiness improves  -continue prn acetaminophen and prn tizanidine 2 mg TID      Patient is stable. Will trial " decreasing gabapentin dose, removing the afternoon dose to see if this helps to decrease her sleepiness. Will consider adjusting her Parkinson meds if decreasing melchor is not successful. Also encouraged spouse to continue to play music videos that patient enjoys, noted that she was very engaged while Bluegrass music was on the TV. Will continue with House Call visits every 2 months, next scheduled May 14.     Time Spent  Prep time on day of patient encounter: 5 minutes  Time spent directly with patient, family or caregiver: 40 minutes  Documentation Time: 15 minutes        Sara Elizabeth, APRN-CNP

## 2025-03-19 ENCOUNTER — APPOINTMENT (OUTPATIENT)
Dept: PRIMARY CARE | Facility: CLINIC | Age: 89
End: 2025-03-19
Payer: MEDICARE

## 2025-03-19 ASSESSMENT — ENCOUNTER SYMPTOMS
VOMITING: 0
DECREASED CONCENTRATION: 0
HALLUCINATIONS: 0
WOUND: 0
DIZZINESS: 0
RECTAL PAIN: 0
BACK PAIN: 0
BLOOD IN STOOL: 0
NERVOUS/ANXIOUS: 0
FLANK PAIN: 0
HEMATURIA: 0
BRUISES/BLEEDS EASILY: 0
FATIGUE: 1
CONFUSION: 1
ANAL BLEEDING: 0
RHINORRHEA: 1
CHILLS: 0
LIGHT-HEADEDNESS: 0
DYSPHORIC MOOD: 0
FACIAL ASYMMETRY: 0
CONSTIPATION: 1
WEAKNESS: 1
COUGH: 0
SLEEP DISTURBANCE: 1
SEIZURES: 0
SINUS PRESSURE: 0
APPETITE CHANGE: 0
FREQUENCY: 0
SORE THROAT: 0
SPEECH DIFFICULTY: 1
DYSURIA: 0
ABDOMINAL DISTENTION: 0
HEADACHES: 0
SHORTNESS OF BREATH: 0
TROUBLE SWALLOWING: 1
CHEST TIGHTNESS: 0
DIARRHEA: 0
TREMORS: 1
ABDOMINAL PAIN: 0
DIAPHORESIS: 0
WHEEZING: 0
AGITATION: 0
PALPITATIONS: 0
UNEXPECTED WEIGHT CHANGE: 0
FEVER: 0
ARTHRALGIAS: 0
SINUS PAIN: 0
NAUSEA: 0

## 2025-03-20 NOTE — ASSESSMENT & PLAN NOTE
Depression/Anxiety - Mood is stable.   -continue prn hydroxyzine 25 mg and sertraline 100 mg  -maintain routine follow up with [facility] for continued evaluation and treatments

## 2025-03-20 NOTE — ASSESSMENT & PLAN NOTE
Muscle spasms/OA - pain is controlled. Caregivers encourage her to use her stationary pedal bike and perform passive ROM exercises with her.   -continue physical activity as tolerated  -decreased gabapentin 100 mg to BID from TID,  to give update if daytime sleepiness improves  -continue prn acetaminophen and prn tizanidine 2 mg TID

## 2025-03-20 NOTE — ASSESSMENT & PLAN NOTE
Dysphagia - Spouse reports that the patient is having more difficulty swallowing her pills. No aspiration episodes, eating well.   -encouraged spouse to crush pills and give with applesauce, pudding, or yogurt  -aspiration precautions: small bites, small sips, no use of straws, head/chin method for swallowing    Constipation - Has had trouble with intermittent constipation and diarrhea.   -continue prn miralax, daily colace.   -encourage adequate hydration with water and increase in dietary fiber

## 2025-03-26 ENCOUNTER — TELEPHONE (OUTPATIENT)
Dept: PRIMARY CARE | Facility: CLINIC | Age: 89
End: 2025-03-26
Payer: MEDICARE

## 2025-03-26 NOTE — PROGRESS NOTES
Spoke with Jhonatan regarding patient’s daytime sleepiness. He reports that removing the afternoon dose of Gabapentin seems to help a little, it there was a day or two when she had heightened anxiety. Advised that he could give the afternoon dose of Gabapentin as needed for anxiety. Continue scheduled morning and evening doses.

## 2025-04-11 ENCOUNTER — TELEPHONE (OUTPATIENT)
Dept: PRIMARY CARE | Facility: CLINIC | Age: 89
End: 2025-04-11
Payer: MEDICARE

## 2025-04-11 NOTE — TELEPHONE ENCOUNTER
Maru calls to report patient has been experiencing weakness and day sleeping x 2-3 days now, feels patient may need IV fluids again.  Patient is eating and drinking, Pascale states she is pushing fluids for this patient but id doesn't seem to be helping.  Please Advise.

## 2025-04-20 DIAGNOSIS — M62.838 MUSCLE SPASM: ICD-10-CM

## 2025-04-21 RX ORDER — TIZANIDINE 2 MG/1
2 TABLET ORAL 3 TIMES DAILY PRN
Qty: 120 TABLET | Refills: 0 | Status: SHIPPED | OUTPATIENT
Start: 2025-04-21

## 2025-05-13 ENCOUNTER — TELEPHONE (OUTPATIENT)
Dept: PRIMARY CARE | Facility: CLINIC | Age: 89
End: 2025-05-13
Payer: MEDICARE

## 2025-05-13 NOTE — TELEPHONE ENCOUNTER
5/14/25 House Calls visit with Sara Elizabeth NP confirmed via phone with Jhonatan/ patient's spouse.

## 2025-05-14 ENCOUNTER — OFFICE VISIT (OUTPATIENT)
Dept: PRIMARY CARE | Facility: CLINIC | Age: 89
End: 2025-05-14
Payer: MEDICARE

## 2025-05-14 VITALS
HEART RATE: 75 BPM | TEMPERATURE: 97.3 F | RESPIRATION RATE: 14 BRPM | SYSTOLIC BLOOD PRESSURE: 115 MMHG | OXYGEN SATURATION: 95 % | DIASTOLIC BLOOD PRESSURE: 77 MMHG

## 2025-05-14 DIAGNOSIS — M15.0 PRIMARY OSTEOARTHRITIS INVOLVING MULTIPLE JOINTS: ICD-10-CM

## 2025-05-14 DIAGNOSIS — F41.1 GENERALIZED ANXIETY DISORDER: ICD-10-CM

## 2025-05-14 DIAGNOSIS — M81.0 AGE-RELATED OSTEOPOROSIS WITHOUT CURRENT PATHOLOGICAL FRACTURE: ICD-10-CM

## 2025-05-14 DIAGNOSIS — R13.10 DYSPHAGIA, UNSPECIFIED TYPE: ICD-10-CM

## 2025-05-14 DIAGNOSIS — E11.9 TYPE 2 DIABETES MELLITUS WITHOUT COMPLICATION, WITHOUT LONG-TERM CURRENT USE OF INSULIN: ICD-10-CM

## 2025-05-14 DIAGNOSIS — M41.9 SCOLIOSIS OF LUMBAR SPINE, UNSPECIFIED SCOLIOSIS TYPE: ICD-10-CM

## 2025-05-14 DIAGNOSIS — I77.9 CAROTID ARTERY DISEASE, UNSPECIFIED LATERALITY, UNSPECIFIED TYPE: ICD-10-CM

## 2025-05-14 DIAGNOSIS — F32.9 MAJOR DEPRESSIVE DISORDER, REMISSION STATUS UNSPECIFIED, UNSPECIFIED WHETHER RECURRENT: ICD-10-CM

## 2025-05-14 DIAGNOSIS — N39.41 URGE INCONTINENCE OF URINE: ICD-10-CM

## 2025-05-14 DIAGNOSIS — K59.01 SLOW TRANSIT CONSTIPATION: ICD-10-CM

## 2025-05-14 DIAGNOSIS — E78.5 HYPERLIPIDEMIA, UNSPECIFIED HYPERLIPIDEMIA TYPE: ICD-10-CM

## 2025-05-14 DIAGNOSIS — M62.838 MUSCLE SPASM: ICD-10-CM

## 2025-05-14 DIAGNOSIS — I25.10 CORONARY ARTERIOSCLEROSIS: ICD-10-CM

## 2025-05-14 DIAGNOSIS — G20.A1 PARKINSON'S DISEASE, UNSPECIFIED WHETHER DYSKINESIA PRESENT, UNSPECIFIED WHETHER MANIFESTATIONS FLUCTUATE: Primary | ICD-10-CM

## 2025-05-14 DIAGNOSIS — K21.9 GASTRO-ESOPHAGEAL REFLUX DISEASE WITHOUT ESOPHAGITIS: ICD-10-CM

## 2025-05-14 PROCEDURE — 1159F MED LIST DOCD IN RCRD: CPT

## 2025-05-14 PROCEDURE — 1160F RVW MEDS BY RX/DR IN RCRD: CPT

## 2025-05-14 PROCEDURE — 99349 HOME/RES VST EST MOD MDM 40: CPT

## 2025-05-14 NOTE — Clinical Note
Uriel Palacios. The patient's spouse is requesting a 100 mg tablet, so he can cut the pill in half. He reports that she will spit out any liquid medication. Is there a way I can get this medication covered? Thank you in advance.

## 2025-05-14 NOTE — PROGRESS NOTES
Subjective   Patient ID: Rohini Butler is a 89 y.o. female who presents for Follow-up (Parkinson's, CAD, HTN, HLD, Anxiety/Depression).    HPI   Reports large, formed, BM this morning  No aspiration concerns per caregiver, but she is more resistant to taking meds.    Patient is seen in her private home today. She is alert and oriented to self only. She remains homebound due to mobility issues and advancing dementia.      PMH: CAD, HLD, Parkinson's, GERD, anxiety, depression, OA     Patient seen today up in recliner in her second story bedroom. Her caregiver is present for assessment. Patient is a very poor historian secondary to Parkinson's/ dementia; HPI and ROS were supplemented by caregiver and patient's chart. Caregiver reports she has been cooperative with her exercises, but tends to sleep more often during the day, which her  also expresses concern for. She continues to follow with Michelle Chavez CNP with [facility] for mental health services.  Patient remains unable to walk but she continues to do stregthen exercises with spouse and caregivers. No recent falls reported. She continues to have private duty care givers from [facility] [employer] 5-6 days a week for 5 hours at a time. The couple's 5 sons continue take turn throughout the week coming over to give addtional support to patient. Overall appetite and hydration status stable per caregiver. The patient continues with intermittent constipation, miralax has been helpful. Spouse continues to manage all of patient's medications and medical care. He reports that she is having more difficulty swallowing her pills, she will at times pocket them or spit them out.       Parkinson's: Does not follow with a neurologist. No changes in carbidopa-levodopa, spouse states that he does give her all doses, but the timing is not always consistent from day to day. He reports that the patient seems to sleep most of the day, except when it is time for meals.      Bilateral Knee Pain/ OA/ Muscle Spasms - Muscle relaxant given daily with some success. Caregiver states that stretching patient's legs and performing exercises that PT/OT prescribed for patient help to keep her BLE contractions from getting worse.      Anxiety/ Depression - Patient continues to follow with Mental Health Services. Caregiver and  do not report any concerning behaviors or sadness, but do report that she is more sleepy for the last several weeks.      Home Visit: Medically necessary due to dementia/cognitive impairment, unsteady gait/poor balance, and Illness or condition that results in activity limitation or restriction that impacts the ability to leave home such as: equipment and /or human assistance needed to safely leave the home, patient has limited support systems to help the patient attend office visits, the office visit would require excessive physical effort or pain for the patient.     Current Medications[1]     Review of Systems   Reason unable to perform ROS: dementia.       Objective   /77   Pulse 75   Temp 36.3 °C (97.3 °F)   Resp 14   LMP  (LMP Unknown)   SpO2 95% Comment: room air  Lab Results   Component Value Date    HGBA1C 5.8 (H) 12/20/2024      Lab Results   Component Value Date    WBC 8.0 12/20/2024    HGB 12.1 12/20/2024    HCT 38.3 12/20/2024    MCV 97 12/20/2024     12/20/2024      Lab Results   Component Value Date    GLUCOSE 119 (H) 12/20/2024    CALCIUM 8.9 12/20/2024     (L) 12/20/2024    K 4.1 12/20/2024    CO2 26 12/20/2024    CL 98 12/20/2024    BUN 18 12/20/2024    CREATININE 0.59 12/20/2024      Lab Results   Component Value Date    INR 1.1 03/20/2022    INR 1.0 01/23/2021    INR 1.0 01/13/2021    PROTIME 13.2 03/20/2022    PROTIME 11.7 01/23/2021    PROTIME 11.5 01/13/2021          Physical Exam  Constitutional:       General: She is not in acute distress.     Appearance: She is not toxic-appearing.      Comments: Sleepy, chronically  "ill appearing, frail.    HENT:      Nose: No congestion or rhinorrhea.      Mouth/Throat:      Mouth: Mucous membranes are moist.      Pharynx: Oropharynx is clear. No oropharyngeal exudate or posterior oropharyngeal erythema.   Eyes:      General: No scleral icterus.     Extraocular Movements: Extraocular movements intact.      Conjunctiva/sclera: Conjunctivae normal.      Pupils: Pupils are equal, round, and reactive to light.   Neck:      Comments: Decreased ROM, chronic.  Cardiovascular:      Rate and Rhythm: Normal rate and regular rhythm.      Heart sounds: Murmur heard.   Pulmonary:      Effort: Pulmonary effort is normal. No respiratory distress.      Breath sounds: No stridor. No wheezing, rhonchi or rales.   Abdominal:      General: There is no distension.      Palpations: Abdomen is soft. There is no mass.      Tenderness: There is no abdominal tenderness. There is no guarding.      Hernia: No hernia is present.      Comments: Hyperactive BS all quadrants   Musculoskeletal:      Right lower leg: No edema.      Left lower leg: No edema.      Comments: Kyphosis noted.    Skin:     General: Skin is warm and dry.      Coloration: Skin is not jaundiced or pale.      Findings: Bruising and lesion present. No erythema or rash.      Comments: Very small \"pimple\" on right thigh. White, no pain, no erythema, no edema, no drainage.   Scattered ecchymosis on BUE   Neurological:      Mental Status: Mental status is at baseline.   Psychiatric:      Comments: Sleepy, but cooperative.          Assessment/Plan                Sara Elizabeth, APRN-CNP         [1]   Current Outpatient Medications:     acetaminophen (Tylenol) 500 mg tablet, 1-2 tablets as needed Orally every 8 hrs as needed, Disp: , Rfl:     carbidopa-levodopa (Sinemet)  mg tablet, TAKE 2 TABLETS BY MOUTH IN THE MORNING AND IN THE AFTERNOON AND TAKE  1 TABLET IN THE EVENING, Disp: 450 tablet, Rfl: 3    docusate sodium (Colace) 100 mg capsule, Take 2 " capsules (200 mg) by mouth 2 times a day as needed for constipation., Disp: , Rfl:     gabapentin (Neurontin) 100 mg capsule, Take 1 capsule (100 mg) by mouth 2 times a day., Disp: 180 capsule, Rfl: 3    hydrOXYzine HCL (Atarax) 25 mg tablet, Take 1 tablet (25 mg) by mouth once daily as needed., Disp: , Rfl:     nystatin (Mycostatin) ointment, Apply 1 Application topically 2 times a day., Disp: , Rfl:     OneTouch Ultra Test strip, 1 strip once daily., Disp: , Rfl:     polyethylene glycol (Miralax) 17 gram/dose powder, as directed Orally Once a day, Disp: , Rfl:     sennosides (senna) 8.6 mg capsule, Take 1 capsule (8.6 mg) by mouth 2 times a day., Disp: 30 capsule, Rfl: 1    sertraline (Zoloft) 100 mg tablet, Take 1.5 tablets (150 mg) by mouth once daily at bedtime., Disp: 135 tablet, Rfl: 3    tiZANidine (Zanaflex) 2 mg tablet, Take 1 tablet by mouth three times daily as needed for muscle spasm, Disp: 120 tablet, Rfl: 0     neurologist.   -continue carbidopa - levodopa  mg as prescribed  -continue safety interventions      Patient is stable, but noticeable decline this visit. Much more sleepy and not as engaged in visit. No aspiration episodes, but patient is at high risk for aspiration. Education regarding small bites and sips, sitting upright at 90 degrees for all oral intake, and head to chin tilt for swallowing. Crushing all medications now. Spouse is not present for GOC discussion. Will continue with House Call NP visits every two months and as needed.          Sara Elizabeth, APRN-CNP         [1]   Current Outpatient Medications:     acetaminophen (Tylenol) 500 mg tablet, 1-2 tablets as needed Orally every 8 hrs as needed, Disp: , Rfl:     carbidopa-levodopa (Sinemet)  mg tablet, TAKE 2 TABLETS BY MOUTH IN THE MORNING AND IN THE AFTERNOON AND TAKE  1 TABLET IN THE EVENING, Disp: 450 tablet, Rfl: 3    docusate sodium (Colace) 100 mg capsule, Take 2 capsules (200 mg) by mouth 2 times a day as needed for constipation., Disp: , Rfl:     gabapentin (Neurontin) 100 mg capsule, Take 1 capsule (100 mg) by mouth 2 times a day., Disp: 180 capsule, Rfl: 3    hydrOXYzine HCL (Atarax) 25 mg tablet, Take 1 tablet (25 mg) by mouth once daily as needed., Disp: , Rfl:     nystatin (Mycostatin) ointment, Apply 1 Application topically 2 times a day., Disp: , Rfl:     OneTouch Ultra Test strip, 1 strip once daily., Disp: , Rfl:     polyethylene glycol (Miralax) 17 gram/dose powder, as directed Orally Once a day, Disp: , Rfl:     sennosides (senna) 8.6 mg capsule, Take 1 capsule (8.6 mg) by mouth 2 times a day., Disp: 30 capsule, Rfl: 1    sertraline (Zoloft) 100 mg tablet, Take 1.5 tablets (150 mg) by mouth once daily at bedtime., Disp: 135 tablet, Rfl: 3    tiZANidine (Zanaflex) 2 mg tablet, Take 1 tablet by mouth three times daily as needed for muscle spasm, Disp: 120 tablet, Rfl: 0

## 2025-05-29 NOTE — ASSESSMENT & PLAN NOTE
Depression/Anxiety - Mood is stable.   -continue prn hydroxyzine 25 mg and sertraline 100 mg  -maintain routine follow up with [facility] for continued evaluation and treatments

## 2025-05-29 NOTE — ASSESSMENT & PLAN NOTE
Muscle spasms/OA - pain is controlled. Caregivers encourage her to use her stationary pedal bike and perform passive ROM exercises with her.   -continue physical activity as tolerated  -continue gabapentin 100 mg BID   -continue prn acetaminophen and prn tizanidine 2 mg TID

## 2025-05-29 NOTE — ASSESSMENT & PLAN NOTE
HTN/HLD - BP stable, no cardiovascular medications currently.   -spouse to notify provider for any new cardiac concerns   -routinely monitor cbc, cmp, and lipids

## 2025-06-10 ENCOUNTER — TELEPHONE (OUTPATIENT)
Dept: PRIMARY CARE | Facility: CLINIC | Age: 89
End: 2025-06-10
Payer: MEDICARE

## 2025-06-10 DIAGNOSIS — M41.9 SCOLIOSIS OF LUMBAR SPINE, UNSPECIFIED SCOLIOSIS TYPE: ICD-10-CM

## 2025-06-10 DIAGNOSIS — Z74.09 IMPAIRED MOBILITY: ICD-10-CM

## 2025-06-10 DIAGNOSIS — Z74.09 DOES NOT WALK: ICD-10-CM

## 2025-06-10 DIAGNOSIS — M15.0 PRIMARY OSTEOARTHRITIS INVOLVING MULTIPLE JOINTS: ICD-10-CM

## 2025-06-10 DIAGNOSIS — G20.A1 PARKINSON'S DISEASE, UNSPECIFIED WHETHER DYSKINESIA PRESENT, UNSPECIFIED WHETHER MANIFESTATIONS FLUCTUATE: Primary | ICD-10-CM

## 2025-06-10 NOTE — TELEPHONE ENCOUNTER
Patient in need of new handicap parking placard.  Please mail to patient home when completed and let  know.

## 2025-06-11 NOTE — TELEPHONE ENCOUNTER
Printed and mailed to patient. Jhonatan, patient  made aware Rx. Placed in outgoing mail today.

## 2025-07-14 ENCOUNTER — TELEPHONE (OUTPATIENT)
Dept: PRIMARY CARE | Facility: CLINIC | Age: 89
End: 2025-07-14
Payer: MEDICARE

## 2025-07-14 NOTE — TELEPHONE ENCOUNTER
Patient , Jhonatan returning call to office.  Address and insurance confirmed.  Jhonatan aware one hour before and/or after appointment time for provider arrival.  Per Jhonatan, no COVID and/or measles exposure.  Verbal consent for appointment obtained while on the phone with this nurse.  Appointment confirmed.

## 2025-07-15 ENCOUNTER — OFFICE VISIT (OUTPATIENT)
Dept: PRIMARY CARE | Facility: CLINIC | Age: 89
End: 2025-07-15
Payer: MEDICARE

## 2025-07-15 DIAGNOSIS — E78.5 HYPERLIPIDEMIA, UNSPECIFIED HYPERLIPIDEMIA TYPE: ICD-10-CM

## 2025-07-15 DIAGNOSIS — E53.8 VITAMIN B12 DEFICIENCY: ICD-10-CM

## 2025-07-15 DIAGNOSIS — I10 PRIMARY HYPERTENSION: ICD-10-CM

## 2025-07-15 DIAGNOSIS — M62.838 MUSCLE SPASM: ICD-10-CM

## 2025-07-15 DIAGNOSIS — M41.46 NEUROMUSCULAR SCOLIOSIS OF LUMBAR REGION: ICD-10-CM

## 2025-07-15 DIAGNOSIS — E55.9 VITAMIN D DEFICIENCY: ICD-10-CM

## 2025-07-15 DIAGNOSIS — R13.10 DYSPHAGIA, UNSPECIFIED TYPE: ICD-10-CM

## 2025-07-15 DIAGNOSIS — F32.9 MAJOR DEPRESSIVE DISORDER, REMISSION STATUS UNSPECIFIED, UNSPECIFIED WHETHER RECURRENT: ICD-10-CM

## 2025-07-15 DIAGNOSIS — K59.01 SLOW TRANSIT CONSTIPATION: ICD-10-CM

## 2025-07-15 DIAGNOSIS — G20.A1 PARKINSON'S DISEASE WITHOUT FLUCTUATING MANIFESTATIONS, UNSPECIFIED WHETHER DYSKINESIA PRESENT: Primary | ICD-10-CM

## 2025-07-15 DIAGNOSIS — I77.9 CAROTID ARTERY DISEASE, UNSPECIFIED LATERALITY, UNSPECIFIED TYPE: ICD-10-CM

## 2025-07-15 PROCEDURE — 1036F TOBACCO NON-USER: CPT

## 2025-07-15 PROCEDURE — 36415 COLL VENOUS BLD VENIPUNCTURE: CPT

## 2025-07-15 PROCEDURE — 1160F RVW MEDS BY RX/DR IN RCRD: CPT

## 2025-07-15 PROCEDURE — 99349 HOME/RES VST EST MOD MDM 40: CPT

## 2025-07-15 PROCEDURE — 3075F SYST BP GE 130 - 139MM HG: CPT

## 2025-07-15 PROCEDURE — 1159F MED LIST DOCD IN RCRD: CPT

## 2025-07-15 PROCEDURE — 3078F DIAST BP <80 MM HG: CPT

## 2025-07-15 RX ORDER — TIZANIDINE 2 MG/1
2 TABLET ORAL 3 TIMES DAILY PRN
Qty: 120 TABLET | Refills: 0 | Status: SHIPPED | OUTPATIENT
Start: 2025-07-15

## 2025-07-15 NOTE — PROGRESS NOTES
Subjective   Patient ID: Rohini Butler is a 89 y.o. female who presents for Follow-up (Multiple chronic medical conditions).    HPI     NEEDS:   hospice referral (NOT [facility])  Zoloft refill (ask Michelle)    Eating lunch: no aspiration concerns  BM are good: every other day  No falls      Home Visit: Medically necessary due to dementia/cognitive impairment, unsteady gait/poor balance, and Illness or condition that results in activity limitation or restriction that impacts the ability to leave home such as: equipment and /or human assistance needed to safely leave the home, patient has limited support systems to help the patient attend office visits, the office visit would require excessive physical effort or pain for the patient.     Current Medications[1]     Review of Systems    Objective   LMP  (LMP Unknown)   Lab Results   Component Value Date    HGBA1C 5.8 (H) 12/20/2024      Lab Results   Component Value Date    WBC 8.0 12/20/2024    HGB 12.1 12/20/2024    HCT 38.3 12/20/2024    MCV 97 12/20/2024     12/20/2024      Lab Results   Component Value Date    GLUCOSE 119 (H) 12/20/2024    CALCIUM 8.9 12/20/2024     (L) 12/20/2024    K 4.1 12/20/2024    CO2 26 12/20/2024    CL 98 12/20/2024    BUN 18 12/20/2024    CREATININE 0.59 12/20/2024      Lab Results   Component Value Date    INR 1.1 03/20/2022    INR 1.0 01/23/2021    INR 1.0 01/13/2021    PROTIME 13.2 03/20/2022    PROTIME 11.7 01/23/2021    PROTIME 11.5 01/13/2021          Physical Exam    Assessment/Plan                Sara Elizabeth, APRN-CNP             [1]    Current Outpatient Medications:   •  acetaminophen (Tylenol) 500 mg tablet, 1-2 tablets as needed Orally every 8 hrs as needed, Disp: , Rfl:   •  carbidopa-levodopa (Sinemet)  mg tablet, TAKE 2 TABLETS BY MOUTH IN THE MORNING AND IN THE AFTERNOON AND TAKE  1 TABLET IN THE EVENING, Disp: 450 tablet, Rfl: 3  •  docusate sodium (Colace) 100 mg capsule, Take 2 capsules (200 mg) by mouth 2  times a day as needed for constipation., Disp: , Rfl:   •  gabapentin (NEURONTIN) 100 mg tablet, Take 1 tablet (100 mg) by mouth 3 times a day., Disp: 90 tablet, Rfl: 11  •  hydrOXYzine HCL (Atarax) 25 mg tablet, Take 1 tablet (25 mg) by mouth once daily as needed., Disp: , Rfl:   •  nystatin (Mycostatin) ointment, Apply 1 Application topically 2 times a day., Disp: , Rfl:   •  OneTouch Ultra Test strip, 1 strip once daily., Disp: , Rfl:   •  polyethylene glycol (Miralax) 17 gram/dose powder, as directed Orally Once a day, Disp: , Rfl:   •  sennosides (senna) 8.6 mg capsule, Take 1 capsule (8.6 mg) by mouth 2 times a day., Disp: 30 capsule, Rfl: 1  •  sertraline (Zoloft) 100 mg tablet, Take 1.5 tablets (150 mg) by mouth once daily at bedtime., Disp: 135 tablet, Rfl: 3  •  tiZANidine (Zanaflex) 2 mg tablet, Take 1 tablet by mouth three times daily as needed for muscle spasm, Disp: 120 tablet, Rfl: 0   Exam  Constitutional:       General: She is not in acute distress.     Appearance: She is not toxic-appearing.      Comments: Alert, chronically ill appearing, frail.    HENT:      Nose: No congestion or rhinorrhea.      Mouth/Throat:      Mouth: Mucous membranes are moist.      Pharynx: Oropharynx is clear. No oropharyngeal exudate or posterior oropharyngeal erythema.   Eyes:      General: No scleral icterus.     Extraocular Movements: Extraocular movements intact.      Conjunctiva/sclera: Conjunctivae normal.      Pupils: Pupils are equal, round, and reactive to light.   Neck:      Comments: Decreased ROM, chronic.  Cardiovascular:      Rate and Rhythm: Normal rate and regular rhythm.      Heart sounds: Murmur heard.   Pulmonary:      Effort: Pulmonary effort is normal. No respiratory distress.      Breath sounds: No stridor. No wheezing, rhonchi or rales.   Abdominal:      General: There is no distension.      Palpations: Abdomen is soft. There is no mass.      Tenderness: There is no abdominal tenderness. There is no guarding.      Hernia: No hernia is present.      Comments: Hyperactive BS all quadrants   Musculoskeletal:      Right lower leg: No edema.      Left lower leg: No edema.      Comments: Kyphosis noted.    Skin:     General: Skin is warm and dry.      Coloration: Skin is not jaundiced or pale.      Findings: Bruising present. No erythema, lesion, or rash.      Comments:  Scattered ecchymosis on BUE   Neurological:      Mental Status: Mental status is at baseline.   Psychiatric:      Comments: Not as sleepy as in past visits.    Assessment/Plan   Neuro  Parkinson's - stable. Does not follow with neurologist.   -continue carbidopa - levodopa  mg as prescribed  -continue safety interventions    Mental Health  Depression/Anxiety - Mood is stable.   -continue prn hydroxyzine 25 mg and sertraline 100 mg  -maintain routine follow up with [facility] for continued evaluation and  treatments     Gastrointestinal and Abdominal  Dysphagia - Spouse reports that the patient is having more difficulty swallowing her pills. No aspiration episodes, eating well.   -encouraged spouse to crush pills and give with applesauce, pudding, or yogurt  -aspiration precautions: small bites, small sips, no use of straws, head/chin method for swallowing    Constipation - Has had trouble with intermittent constipation and diarrhea.   -continue prn miralax, daily colace.   -encourage adequate hydration with water and increase in dietary fiber    Cardiac and Vasculature  HTN/HLD - BP stable, no cardiovascular medications currently.   -spouse to notify provider for any new cardiac concerns   -routinely monitor cbc, cmp, and lipids    Patient is stable. Spouse is willing to consider hospice referral (NOT [facility]). Referral placed to [facility]. Routine labs obtained this visit, patient tolerated with minimal discomfort. Will continue with [facility] visits every 2 months and prn.             Sara Elizabeth, APRN-CNP           [1]   Current Outpatient Medications:     acetaminophen (Tylenol) 500 mg tablet, 1-2 tablets as needed Orally every 8 hrs as needed, Disp: , Rfl:     carbidopa-levodopa (Sinemet)  mg tablet, TAKE 2 TABLETS BY MOUTH IN THE MORNING AND IN THE AFTERNOON AND TAKE  1 TABLET IN THE EVENING, Disp: 450 tablet, Rfl: 3    docusate sodium (Colace) 100 mg capsule, Take 2 capsules (200 mg) by mouth 2 times a day as needed for constipation., Disp: , Rfl:     gabapentin (NEURONTIN) 100 mg tablet, Take 1 tablet (100 mg) by mouth 3 times a day., Disp: 90 tablet, Rfl: 11    hydrOXYzine HCL (Atarax) 25 mg tablet, Take 1 tablet (25 mg) by mouth once daily as needed., Disp: , Rfl:     nystatin (Mycostatin) ointment, Apply 1 Application topically 2 times a day., Disp: , Rfl:     OneTouch Ultra Test strip, 1 strip once daily., Disp: , Rfl:     polyethylene glycol (Miralax) 17 gram/dose powder, as directed  Orally Once a day, Disp: , Rfl:     sennosides (senna) 8.6 mg capsule, Take 1 capsule (8.6 mg) by mouth 2 times a day., Disp: 30 capsule, Rfl: 1    sertraline (Zoloft) 100 mg tablet, TAKE 1 & 1/2 (ONE & ONE-HALF) TABLETS BY MOUTH ONCE DAILY AT BEDTIME, Disp: 135 tablet, Rfl: 3    tiZANidine (Zanaflex) 2 mg tablet, Take 1 tablet (2 mg) by mouth 3 times a day as needed for muscle spasms., Disp: 120 tablet, Rfl: 0

## 2025-07-16 LAB
25(OH)D3+25(OH)D2 SERPL-MCNC: 38 NG/ML (ref 30–100)
ALBUMIN SERPL-MCNC: 4 G/DL (ref 3.6–5.1)
ALP SERPL-CCNC: 85 U/L (ref 37–153)
ALT SERPL-CCNC: 5 U/L (ref 6–29)
ANION GAP SERPL CALCULATED.4IONS-SCNC: 8 MMOL/L (CALC) (ref 7–17)
AST SERPL-CCNC: 12 U/L (ref 10–35)
BASOPHILS # BLD AUTO: 26 CELLS/UL (ref 0–200)
BASOPHILS NFR BLD AUTO: 0.3 %
BILIRUB SERPL-MCNC: 0.3 MG/DL (ref 0.2–1.2)
BUN SERPL-MCNC: 20 MG/DL (ref 7–25)
CALCIUM SERPL-MCNC: 10 MG/DL (ref 8.6–10.4)
CHLORIDE SERPL-SCNC: 98 MMOL/L (ref 98–110)
CHOLEST SERPL-MCNC: 246 MG/DL
CHOLEST/HDLC SERPL: 3.4 (CALC)
CO2 SERPL-SCNC: 29 MMOL/L (ref 20–32)
CREAT SERPL-MCNC: 0.82 MG/DL (ref 0.6–0.95)
EGFRCR SERPLBLD CKD-EPI 2021: 69 ML/MIN/1.73M2
EOSINOPHIL # BLD AUTO: 52 CELLS/UL (ref 15–500)
EOSINOPHIL NFR BLD AUTO: 0.6 %
ERYTHROCYTE [DISTWIDTH] IN BLOOD BY AUTOMATED COUNT: 14.3 % (ref 11–15)
GLUCOSE SERPL-MCNC: 105 MG/DL (ref 65–99)
HCT VFR BLD AUTO: 42 % (ref 35–45)
HDLC SERPL-MCNC: 72 MG/DL
HGB BLD-MCNC: 13.1 G/DL (ref 11.7–15.5)
LDLC SERPL CALC-MCNC: 151 MG/DL (CALC)
LYMPHOCYTES # BLD AUTO: 1118 CELLS/UL (ref 850–3900)
LYMPHOCYTES NFR BLD AUTO: 13 %
MCH RBC QN AUTO: 30.5 PG (ref 27–33)
MCHC RBC AUTO-ENTMCNC: 31.2 G/DL (ref 32–36)
MCV RBC AUTO: 97.9 FL (ref 80–100)
MONOCYTES # BLD AUTO: 688 CELLS/UL (ref 200–950)
MONOCYTES NFR BLD AUTO: 8 %
NEUTROPHILS # BLD AUTO: 6717 CELLS/UL (ref 1500–7800)
NEUTROPHILS NFR BLD AUTO: 78.1 %
NONHDLC SERPL-MCNC: 174 MG/DL (CALC)
PLATELET # BLD AUTO: 339 THOUSAND/UL (ref 140–400)
PMV BLD REES-ECKER: 10.7 FL (ref 7.5–12.5)
POTASSIUM SERPL-SCNC: 4.7 MMOL/L (ref 3.5–5.3)
PROT SERPL-MCNC: 7.2 G/DL (ref 6.1–8.1)
RBC # BLD AUTO: 4.29 MILLION/UL (ref 3.8–5.1)
SODIUM SERPL-SCNC: 135 MMOL/L (ref 135–146)
TRIGL SERPL-MCNC: 114 MG/DL
TSH SERPL-ACNC: 1.47 MIU/L (ref 0.4–4.5)
VIT B12 SERPL-MCNC: 451 PG/ML (ref 200–1100)
WBC # BLD AUTO: 8.6 THOUSAND/UL (ref 3.8–10.8)

## 2025-07-17 ENCOUNTER — RESULTS FOLLOW-UP (OUTPATIENT)
Dept: PRIMARY CARE | Facility: CLINIC | Age: 89
End: 2025-07-17
Payer: MEDICARE

## 2025-07-21 DIAGNOSIS — F32.9 MAJOR DEPRESSIVE DISORDER, REMISSION STATUS UNSPECIFIED, UNSPECIFIED WHETHER RECURRENT: ICD-10-CM

## 2025-07-21 RX ORDER — SERTRALINE HYDROCHLORIDE 100 MG/1
TABLET, FILM COATED ORAL
Qty: 135 TABLET | Refills: 3 | Status: SHIPPED | OUTPATIENT
Start: 2025-07-21

## 2025-07-28 ENCOUNTER — TELEPHONE (OUTPATIENT)
Dept: PRIMARY CARE | Facility: CLINIC | Age: 89
End: 2025-07-28
Payer: MEDICARE

## 2025-07-28 VITALS
SYSTOLIC BLOOD PRESSURE: 134 MMHG | OXYGEN SATURATION: 97 % | DIASTOLIC BLOOD PRESSURE: 78 MMHG | RESPIRATION RATE: 19 BRPM | HEART RATE: 59 BPM | TEMPERATURE: 97.7 F

## 2025-07-28 DIAGNOSIS — B02.9 HERPES ZOSTER WITHOUT COMPLICATION: Primary | ICD-10-CM

## 2025-07-28 RX ORDER — VALACYCLOVIR HYDROCHLORIDE 1 G/1
1000 TABLET, FILM COATED ORAL 3 TIMES DAILY
Qty: 21 TABLET | Refills: 0 | Status: SHIPPED | OUTPATIENT
Start: 2025-07-28 | End: 2025-08-04

## 2025-07-28 NOTE — ASSESSMENT & PLAN NOTE
Depression/Anxiety - Mood is stable.   -continue prn hydroxyzine 25 mg and sertraline 100 mg  -maintain routine follow up with [facility] for continued evaluation and treatments

## 2025-07-28 NOTE — PROGRESS NOTES
Subjective   Patient ID: Rohini Butler is a 89 y.o. female who presents for Follow-up (Multiple chronic medical conditions).    HPI   Mrs. Butler is seen in her private home today by [facility] for follow up of multiple chronic medical conditions. She is alert and oriented to self only. She remains homebound due to mobility issues and advancing dementia r/t Parkinson's.      PMH: CAD, HLD, Parkinson's, GERD, anxiety, depression, OA    Eating lunch: no aspiration concerns  BM are good: every other day  No falls    Mrs. Butler is sitting up at the table in her second story bedroom,  eating lunch with her spouse, Jhonatan. She is able to answer simple questions regarding her health, and follow simple commands. She is a poor historian secondary to Parkinson's/ dementia; HPI and ROS were supplemented by spouse and patient's chart. Her caregiver is present for assessment.  Mrs. Butler remains unable to walk, and she is dependent on others for all of her personal care and meals, and now needs assistance feeding herself.  She continues to have private duty care givers from [facility] 5-6 days a week for 5 hours at a time. The couple's 5 sons continue take turn throughout the week coming over to give addtional support to patient.  Overall appetite and hydration status stable per spouse, but she now needs her medications crushed and placed in applesauce or pudding, it is reported that she will spit them out or pocket them if left whole. There are no reported aspiration episodes. She continues with intermittent constipation, miralax has been helpful, she had a large, formed BM this morning. No N/V/D or abdominal pain. She is incontinent of urine, no reported dysuria, hematuria, flank pain, or increased confusion. No reported issues with sleep, she is put to bed around 8 pm, and remains in her bed until her  gets her up around 8 - 9 am. She does sleep for much of the day now, with the exception of meals and her  exercise time.  Mrs. Butler does enjoy listening to bluegrass music, she tends to cheer up and remain engaged when this is playing on the TV radio station. She follows Michelle Chavez CNP at [facility] for psychiatric medication management. There is no reported fever, chills, increased confusion, shortness of breath, chest pain, palpitations, dizziness, headache, or syncope. There have not been any falls.       Parkinson's: Does not follow with a neurologist. No changes in carbidopa-levodopa, spouse states that he does give her all doses, but the timing is not always consistent from day to day. He reports that the patient seems to sleep most of the day, except when it is time for meals.     Bilateral Knee Pain/ OA/ Muscle Spasms - Muscle relaxant given daily with some success. Caregiver that is stretching patient's legs and performing exercises that PT/OT prescribed for patient help to keep her BLE contractions from getting worse.      Anxiety/ Depression - Patient continues to follow with [facility]. Caregiver reports that Mrs. Butler is sleepy much of the time, and does have periods of anxiety/tearfulness when she is awake. Mrs. Butler does enjoy listening to bluegrass music, this helps with her mood.     Home Visit: Medically necessary due to dementia/cognitive impairment, unsteady gait/poor balance, and Illness or condition that results in activity limitation or restriction that impacts the ability to leave home such as: equipment and /or human assistance needed to safely leave the home, patient has limited support systems to help the patient attend office visits, the office visit would require excessive physical effort or pain for the patient.     Current Medications[1]     Review of Systems   Reason unable to perform ROS: dementia.    Patient is able to tell me she is not in any pain, nor does she feel short of breath or dizzy.     Objective   /78   Pulse 59   Temp 36.5 °C (97.7 °F)   Resp  19   LMP  (LMP Unknown)   SpO2 97% Comment: room air    Physical Exam  Constitutional:       General: She is not in acute distress.     Appearance: She is not toxic-appearing.      Comments: Alert, chronically ill appearing, frail.    HENT:      Nose: No congestion or rhinorrhea.      Mouth/Throat:      Mouth: Mucous membranes are moist.      Pharynx: Oropharynx is clear. No oropharyngeal exudate or posterior oropharyngeal erythema.   Eyes:      General: No scleral icterus.     Extraocular Movements: Extraocular movements intact.      Conjunctiva/sclera: Conjunctivae normal.      Pupils: Pupils are equal, round, and reactive to light.   Neck:      Comments: Decreased ROM, chronic.  Cardiovascular:      Rate and Rhythm: Normal rate and regular rhythm.      Heart sounds: Murmur heard.   Pulmonary:      Effort: Pulmonary effort is normal. No respiratory distress.      Breath sounds: No stridor. No wheezing, rhonchi or rales.   Abdominal:      General: There is no distension.      Palpations: Abdomen is soft. There is no mass.      Tenderness: There is no abdominal tenderness. There is no guarding.      Hernia: No hernia is present.      Comments: Hyperactive BS all quadrants   Musculoskeletal:      Right lower leg: No edema.      Left lower leg: No edema.      Comments: Kyphosis noted.    Skin:     General: Skin is warm and dry.      Coloration: Skin is not jaundiced or pale.      Findings: Bruising present. No erythema, lesion, or rash.      Comments:  Scattered ecchymosis on BUE   Neurological:      Mental Status: Mental status is at baseline.   Psychiatric:      Comments: Not as sleepy as in past visits.    Assessment/Plan     NEEDS:   hospice referral (NOT [facility])  Zoloft refill (ask Michelle)           Sara Elizabeth, APRN-CNP               [1]    Current Outpatient Medications:   •  acetaminophen (Tylenol) 500 mg tablet, 1-2 tablets as needed Orally every 8 hrs as needed, Disp: , Rfl:   •  carbidopa-levodopa (Sinemet)   mg tablet, TAKE 2 TABLETS BY MOUTH IN THE MORNING AND IN THE AFTERNOON AND TAKE  1 TABLET IN THE EVENING, Disp: 450 tablet, Rfl: 3  •  docusate sodium (Colace) 100 mg capsule, Take 2 capsules (200 mg) by mouth 2 times a day as needed for constipation., Disp: , Rfl:   •  gabapentin (NEURONTIN) 100 mg tablet, Take 1 tablet (100 mg) by mouth 3 times a day., Disp: 90 tablet, Rfl: 11  •  hydrOXYzine HCL (Atarax) 25 mg tablet, Take 1 tablet (25 mg) by mouth once daily as needed., Disp: , Rfl:   •  nystatin (Mycostatin) ointment, Apply 1 Application topically 2 times a day., Disp: , Rfl:   •  OneTouch Ultra Test strip, 1 strip once daily., Disp: , Rfl:   •  polyethylene glycol (Miralax) 17 gram/dose powder, as directed Orally Once a day, Disp: , Rfl:   •  sennosides (senna) 8.6 mg capsule, Take 1 capsule (8.6 mg) by mouth 2 times a day., Disp: 30 capsule, Rfl: 1  •  sertraline (Zoloft) 100 mg tablet, TAKE 1 & 1/2 (ONE & ONE-HALF) TABLETS BY MOUTH ONCE DAILY AT BEDTIME, Disp: 135 tablet, Rfl: 3  •  tiZANidine (Zanaflex) 2 mg tablet, Take 1 tablet (2 mg) by mouth 3 times a day as needed for muscle spasms., Disp: 120 tablet, Rfl: 0

## 2025-07-29 ENCOUNTER — TELEPHONE (OUTPATIENT)
Dept: PRIMARY CARE | Facility: CLINIC | Age: 89
End: 2025-07-29
Payer: MEDICARE

## 2025-07-29 ENCOUNTER — OFFICE VISIT (OUTPATIENT)
Dept: PRIMARY CARE | Facility: CLINIC | Age: 89
End: 2025-07-29
Payer: MEDICARE

## 2025-07-29 DIAGNOSIS — R21 RASH: Primary | ICD-10-CM

## 2025-07-29 PROCEDURE — 99348 HOME/RES VST EST LOW MDM 30: CPT

## 2025-07-29 NOTE — PROGRESS NOTES
Subjective   Patient ID: Rohini Butler is a 89 y.o. female who presents for Follow-up (rash).    HPI   Mrs. uBtler is seen in her private home today by [facility] for a new rash. She is alert and oriented to self only. She remains homebound due to mobility issues and advancing dementia r/t Parkinson's.      PMH: CAD, HLD, Parkinson's, GERD, anxiety, depression, OA    Her spouse, Jhonatan, called the office yesterday (7/28) to report a new rash on the right side of the patient's face, along her hairline, close to her right eye. Her private caregiver told him it looked like shingles. I prescribed a course of valacyclovir and planned to follow up this week. Jhonatan called the office this morning to report that the rash was much improved, and he did not start the valacyclovir yet. He believes the rash may be related to heat. I was able to see Ms. Butler today to evaluate the rash.   Ms. Butler does not remember when the rash began, but she does report some pain with it. Jhonatan reports that she gets very hot at night, often sweating through her clothing and bed linens. He states that she often sleeps with her head on her hand. Reports some mild swelling on the right side of her face/eye. No drainage from eye.   No fever, chills, malaise, or acute change in mental status. She is eating and drinking well. No N/V/D or abdominal pain.    Home Visit: Medically necessary due to dementia/cognitive impairment, unsteady gait/poor balance, and Illness or condition that results in activity limitation or restriction that impacts the ability to leave home such as: equipment and /or human assistance needed to safely leave the home, patient has limited support systems to help the patient attend office visits, the office visit would require excessive physical effort or pain for the patient.     Current Medications[1]     Review of Systems   Constitutional:  Positive for fatigue. Negative for activity change, appetite change, chills,  diaphoresis, fever and unexpected weight change.        Fatigue is at baseline.   HENT:  Negative for congestion, ear pain, facial swelling, hearing loss, mouth sores, rhinorrhea, sore throat and tinnitus.    Eyes:  Positive for redness. Negative for pain, discharge, itching and visual disturbance.   Respiratory:  Negative for cough, choking, shortness of breath and wheezing.    Cardiovascular:  Negative for chest pain, palpitations and leg swelling.   Gastrointestinal:  Negative for abdominal pain, blood in stool, constipation, diarrhea, nausea and vomiting.   Genitourinary:  Negative for difficulty urinating, dysuria, flank pain, hematuria and vaginal bleeding.   Musculoskeletal:  Positive for arthralgias, back pain and gait problem.   Skin:  Positive for rash. Negative for wound.   Neurological:  Positive for tremors and speech difficulty. Negative for dizziness, seizures, syncope, weakness, light-headedness, numbness and headaches.       Objective   LMP  (LMP Unknown)   Lab Results   Component Value Date    HGBA1C 5.8 (H) 12/20/2024      Lab Results   Component Value Date    WBC 8.6 07/15/2025    HGB 13.1 07/15/2025    HCT 42.0 07/15/2025    MCV 97.9 07/15/2025     07/15/2025      Lab Results   Component Value Date    GLUCOSE 105 (H) 07/15/2025    CALCIUM 10.0 07/15/2025     07/15/2025    K 4.7 07/15/2025    CO2 29 07/15/2025    CL 98 07/15/2025    BUN 20 07/15/2025    CREATININE 0.82 07/15/2025      Lab Results   Component Value Date    INR 1.1 03/20/2022    INR 1.0 01/23/2021    INR 1.0 01/13/2021    PROTIME 13.2 03/20/2022    PROTIME 11.7 01/23/2021    PROTIME 11.5 01/13/2021          Physical Exam  Constitutional:       General: She is not in acute distress.     Appearance: She is not ill-appearing or toxic-appearing.      Comments: Alert, sitting in her wheelchair. Frail. Well-groomed, well-nourished.   HENT:      Right Ear: Tympanic membrane, ear canal and external ear normal.      Left Ear:  Tympanic membrane, ear canal and external ear normal.      Nose: No congestion or rhinorrhea.      Mouth/Throat:      Mouth: Mucous membranes are moist.      Pharynx: Oropharynx is clear. No oropharyngeal exudate or posterior oropharyngeal erythema.     Eyes:      General: No scleral icterus.     Pupils: Pupils are equal, round, and reactive to light.      Comments: Injected conjunctiva, bilateral. No drainage. Right eye with mild periorbital edema.     Cardiovascular:      Rate and Rhythm: Normal rate and regular rhythm.      Heart sounds: Murmur heard.   Pulmonary:      Effort: Pulmonary effort is normal. No respiratory distress.      Breath sounds: No stridor. No wheezing, rhonchi or rales.   Abdominal:      General: There is no distension.      Palpations: Abdomen is soft.      Tenderness: There is no abdominal tenderness.   Genitourinary:     Comments: Did not observe area.    Musculoskeletal:      Cervical back: Neck supple. No tenderness.      Right lower leg: No edema.      Left lower leg: No edema.      Comments: BLE with spastic contractures, unable to straighten independently.   Lymphadenopathy:      Cervical: No cervical adenopathy.     Skin:     General: Skin is warm and dry.      Capillary Refill: Capillary refill takes less than 2 seconds.      Coloration: Skin is not jaundiced.      Findings: Rash present. No bruising.      Comments: Right temporal hairline: 1 cm x 2 cm area with small, raised pustules, intact.       Neurological:      Mental Status: She is alert. Mental status is at baseline.     Psychiatric:      Comments: Calm and engaged in visit.          Assessment/Plan   Patient with rash of unclear etiology, may be related to heat since patient tends to sleep on her right side, with her head on her hand. Spouse reports that he would like to hold off on giving the valacyclovir since it has improved today. He is going to have her sleep on her left side tonight. Will follow up tomorrow  (Wednesday, 7/30)             Sara Elizabeth, APRN-CNP         [1]   Current Outpatient Medications:     acetaminophen (Tylenol) 500 mg tablet, 1-2 tablets as needed Orally every 8 hrs as needed, Disp: , Rfl:     carbidopa-levodopa (Sinemet)  mg tablet, TAKE 2 TABLETS BY MOUTH IN THE MORNING AND IN THE AFTERNOON AND TAKE  1 TABLET IN THE EVENING, Disp: 450 tablet, Rfl: 3    docusate sodium (Colace) 100 mg capsule, Take 2 capsules (200 mg) by mouth 2 times a day as needed for constipation., Disp: , Rfl:     gabapentin (NEURONTIN) 100 mg tablet, Take 1 tablet (100 mg) by mouth 3 times a day., Disp: 90 tablet, Rfl: 11    hydrOXYzine HCL (Atarax) 25 mg tablet, Take 1 tablet (25 mg) by mouth once daily as needed., Disp: , Rfl:     nystatin (Mycostatin) ointment, Apply 1 Application topically 2 times a day., Disp: , Rfl:     OneTouch Ultra Test strip, 1 strip once daily., Disp: , Rfl:     polyethylene glycol (Miralax) 17 gram/dose powder, as directed Orally Once a day, Disp: , Rfl:     sennosides (senna) 8.6 mg capsule, Take 1 capsule (8.6 mg) by mouth 2 times a day., Disp: 30 capsule, Rfl: 1    sertraline (Zoloft) 100 mg tablet, TAKE 1 & 1/2 (ONE & ONE-HALF) TABLETS BY MOUTH ONCE DAILY AT BEDTIME, Disp: 135 tablet, Rfl: 3    tiZANidine (Zanaflex) 2 mg tablet, Take 1 tablet (2 mg) by mouth 3 times a day as needed for muscle spasms., Disp: 120 tablet, Rfl: 0    valACYclovir (Valtrex) 1 gram tablet, Take 1 tablet (1,000 mg) by mouth 3 times a day for 7 days., Disp: 21 tablet, Rfl: 0

## 2025-07-29 NOTE — Clinical Note
Please call Jhonatan this morning to follow up on rash improvement or progression. If rash has worsened, he will need to start the valacyclovir as soon as possible. Please let him know I will call him before the end of the day. Thank you!

## 2025-07-29 NOTE — TELEPHONE ENCOUNTER
Myranda with [facility] with patient now. She states she does not feel pt has shingles and Jhonatan has not started valcyclovir. Myranda put Jhonatan on the phone. He states pt had been sleeping on her hand pressed to left side of face yesterday and rash has resolved. He states pt only has a red quarter size [place] behind right ear that he noticed today. Denies pain, defined border, or warmth. He is asking to speak to provider.

## 2025-07-29 NOTE — TELEPHONE ENCOUNTER
Spoke with patient's , Jhonatan via phone. He reports that a nurse from [facility] said that Rohini has shingles. Jhonatan reports a painful rash on the left side of her face that he noticed this morning when she woke up. She has not complained of any eye pain or changes in vision.     -initiated valacyclovir 1000 g TID x7 days  -will put patient on schedule for this week    Sara Elizabeth, CNP

## 2025-07-30 ENCOUNTER — TELEPHONE (OUTPATIENT)
Dept: PRIMARY CARE | Facility: CLINIC | Age: 89
End: 2025-07-30
Payer: MEDICARE

## 2025-07-30 ASSESSMENT — ENCOUNTER SYMPTOMS
FEVER: 0
NUMBNESS: 0
TREMORS: 1
APPETITE CHANGE: 0
DIZZINESS: 0
WHEEZING: 0
DYSURIA: 0
FATIGUE: 1
COUGH: 0
WOUND: 0
BLOOD IN STOOL: 0
SPEECH DIFFICULTY: 1
EYE DISCHARGE: 0
EYE PAIN: 0
ABDOMINAL PAIN: 0
ARTHRALGIAS: 1
ACTIVITY CHANGE: 0
HEADACHES: 0
EYE REDNESS: 1
BACK PAIN: 1
LIGHT-HEADEDNESS: 0
CONSTIPATION: 0
PALPITATIONS: 0
RHINORRHEA: 0
CHOKING: 0
DIFFICULTY URINATING: 0
SHORTNESS OF BREATH: 0
EYE ITCHING: 0
SEIZURES: 0
DIARRHEA: 0
UNEXPECTED WEIGHT CHANGE: 0
SORE THROAT: 0
NAUSEA: 0
DIAPHORESIS: 0
FACIAL SWELLING: 0
HEMATURIA: 0
CHILLS: 0
WEAKNESS: 0
VOMITING: 0
FLANK PAIN: 0

## 2025-07-30 NOTE — TELEPHONE ENCOUNTER
Spoke with Jhonatan via phone. He reports that the rash on the temporal region of Rohini's face has nearly disappeared. She has a little bit of swelling under her eye, but he thinks she looks 100% better.     Advised him to continue with the cortisone/Aquaphor ointment mixture for the week. No need to give valacyclovir at this time. He will let me know of any changes or further concerns.     Sara Elizabeth, CNP

## 2025-07-31 ENCOUNTER — TELEPHONE (OUTPATIENT)
Dept: PRIMARY CARE | Facility: CLINIC | Age: 89
End: 2025-07-31
Payer: MEDICARE

## 2025-09-02 ENCOUNTER — APPOINTMENT (OUTPATIENT)
Dept: PRIMARY CARE | Facility: CLINIC | Age: 89
End: 2025-09-02
Payer: MEDICARE